# Patient Record
Sex: FEMALE | Race: WHITE | NOT HISPANIC OR LATINO | Employment: OTHER | URBAN - METROPOLITAN AREA
[De-identification: names, ages, dates, MRNs, and addresses within clinical notes are randomized per-mention and may not be internally consistent; named-entity substitution may affect disease eponyms.]

---

## 2017-03-01 ENCOUNTER — LAB (OUTPATIENT)
Dept: INTERNAL MEDICINE | Facility: CLINIC | Age: 67
End: 2017-03-01

## 2017-03-01 ENCOUNTER — TELEPHONE (OUTPATIENT)
Dept: ENDOCRINOLOGY | Facility: CLINIC | Age: 67
End: 2017-03-01

## 2017-03-01 DIAGNOSIS — R94.6 ABNORMAL THYROID FUNCTION TEST: Primary | ICD-10-CM

## 2017-03-01 DIAGNOSIS — R94.6 ABNORMAL THYROID FUNCTION TEST: ICD-10-CM

## 2017-03-01 LAB
T4 FREE SERPL-MCNC: 1.14 NG/DL (ref 0.89–1.76)
TSH SERPL DL<=0.05 MIU/L-ACNC: 3.23 MIU/ML (ref 0.35–5.35)

## 2017-03-01 PROCEDURE — 84443 ASSAY THYROID STIM HORMONE: CPT | Performed by: INTERNAL MEDICINE

## 2017-03-01 PROCEDURE — 84439 ASSAY OF FREE THYROXINE: CPT | Performed by: INTERNAL MEDICINE

## 2017-03-01 NOTE — TELEPHONE ENCOUNTER
Spoke to pt. She will stop by clinic and have TFT's completed today.   Mirtha Salinas MD      Please see PT request      ----- Message from Jeanne Springer sent at 2/28/2017  1:29 PM EST -----  Contact: PATIENT  PATIENT IS CALLING WANTING TO BE SEEN SOONER THEN HER SCHEDULED APPOINTMENT WITH DR. ROB WHICH IS 04/19/2017. DR. ROB TOOK PATIENT OF HER THYROID MEDICATION BACK IN NOVEMBER AND IS NOW HAVING ISSUES. SHE SAYS HER HAIR IS FALLING OUT, SHE IS FEELING WEAK AND HAS NO ENERGY. SHE THINKS IT MIGHT BE A THYROID ISSUE. PATIENT IS LEAVING TOWN TOMORROW FOR VACATION AND WAS HOPING TO BE SEEN WHEN SHE GETS BACK OR WHAT SUGGESTIONS YOU HAVE FOR HER AS SHE WAITS TILL HER NEXT SCHEDULED APPOINTMENT. YOU CAN REACH HER BACK [phone]

## 2017-03-10 DIAGNOSIS — E11.8 TYPE 2 DIABETES MELLITUS WITH COMPLICATION, WITHOUT LONG-TERM CURRENT USE OF INSULIN (HCC): Primary | ICD-10-CM

## 2017-03-10 RX ORDER — METFORMIN HYDROCHLORIDE 500 MG/1
1000 TABLET, EXTENDED RELEASE ORAL
Qty: 180 TABLET | Refills: 3 | Status: SHIPPED | OUTPATIENT
Start: 2017-03-10 | End: 2018-03-14 | Stop reason: SDUPTHER

## 2017-03-10 RX ORDER — BLOOD SUGAR DIAGNOSTIC
STRIP MISCELLANEOUS
Qty: 150 EACH | Refills: 3 | Status: SHIPPED | OUTPATIENT
Start: 2017-03-10 | End: 2017-03-22 | Stop reason: SDUPTHER

## 2017-03-22 ENCOUNTER — TELEPHONE (OUTPATIENT)
Dept: ENDOCRINOLOGY | Facility: CLINIC | Age: 67
End: 2017-03-22

## 2017-03-22 DIAGNOSIS — E11.8 TYPE 2 DIABETES MELLITUS WITH COMPLICATION, WITHOUT LONG-TERM CURRENT USE OF INSULIN (HCC): ICD-10-CM

## 2017-03-22 RX ORDER — BLOOD SUGAR DIAGNOSTIC
STRIP MISCELLANEOUS
Qty: 150 EACH | Refills: 3 | Status: SHIPPED | OUTPATIENT
Start: 2017-03-22 | End: 2018-07-17 | Stop reason: SDUPTHER

## 2017-03-22 NOTE — TELEPHONE ENCOUNTER
RESEND RX WITH INSTRUCTIONS TO TEST 1-2 TIMES DAILY AND NOT PRN OR AS DIRECTED.  -        Message from Jeanne Springer sent at 3/22/2017 12:19 PM EDT -----  Contact: [facility] IN [place]  THEY NEED CLARIFICATION ON THE TYPE OF TEST STRIP PATIENT NEEDS AND THE DIRECTIONS OF USAGE OF TEST STRIPS. PATIENT USED TO GET ACU CHECK PLUS TEST STRIPS WHICH THE NEW SCRIPT CALLED IN IS NOW ACU CHECK AVIA PLUS. YOU CAN REACH THEM BACK [phone]

## 2017-04-19 ENCOUNTER — OFFICE VISIT (OUTPATIENT)
Dept: ENDOCRINOLOGY | Facility: CLINIC | Age: 67
End: 2017-04-19

## 2017-04-19 VITALS
DIASTOLIC BLOOD PRESSURE: 82 MMHG | HEART RATE: 62 BPM | OXYGEN SATURATION: 98 % | BODY MASS INDEX: 33.75 KG/M2 | SYSTOLIC BLOOD PRESSURE: 122 MMHG | WEIGHT: 190.5 LBS | HEIGHT: 63 IN

## 2017-04-19 DIAGNOSIS — R94.6 ABNORMAL THYROID FUNCTION TEST: ICD-10-CM

## 2017-04-19 DIAGNOSIS — E11.8 TYPE 2 DIABETES MELLITUS WITH COMPLICATION, WITHOUT LONG-TERM CURRENT USE OF INSULIN (HCC): Primary | ICD-10-CM

## 2017-04-19 LAB
GLUCOSE BLDC GLUCOMTR-MCNC: 163 MG/DL (ref 70–130)
HBA1C MFR BLD: 7.2 %

## 2017-04-19 PROCEDURE — 82947 ASSAY GLUCOSE BLOOD QUANT: CPT | Performed by: INTERNAL MEDICINE

## 2017-04-19 PROCEDURE — 99214 OFFICE O/P EST MOD 30 MIN: CPT | Performed by: INTERNAL MEDICINE

## 2017-04-19 PROCEDURE — 83036 HEMOGLOBIN GLYCOSYLATED A1C: CPT | Performed by: INTERNAL MEDICINE

## 2017-04-19 RX ORDER — HYDROCODONE BITARTRATE AND ACETAMINOPHEN 5; 325 MG/1; MG/1
TABLET ORAL
Refills: 0 | COMMUNITY
Start: 2017-04-10 | End: 2017-10-23 | Stop reason: ALTCHOICE

## 2017-04-19 NOTE — PROGRESS NOTES
Chief Complaint   Patient presents with   • Diabetes     Follow UP      HPI:   Chayito Lua is a 67 y.o.female who returns to [facility]ocrine Clinic for f/u evaluation of of her Type 2 DM and h/o abnormal TFTs. Last clinic visit 12/05/2016. Her history is as follows:    Interim Events: has torn Right rotator cuff for which received glucocorticoid injection 2 weeks ago  - has gained 5 lbs    1) Type 2 diabetes with associated complications of CAD and mild peripheral neuropathy:  - diagnosed in approximately 2000    Current DM Medications:  - metformin ER 1000 g daily    Glucometer Review:  - pt checking once daily in AM or midday. Majority of BGs at those times are <150. Higher AM BG's are due to late night meals    DM Health Maintenance:  Ophtho: Last visit - 6/2016, neg for retinopathy per patient  Podiatry: Last visit - N/A  Monofilament: due 2017  Lipids: per pt checked at cardiologist's office. Pt is on Crestor and Zetia  Urine Microalb/Cr ratio: due 2017, on an ARB  Aspirin: 81 mg daily    2) h/o abnormal TFT's:   - in 2012 pt was having various symptoms including fatigue and joint pain. She had been prescribed plaquenil and developed anemia due to the medication. To her knowledge, her TSH was never elevated at that time. However, she was prescribed liothyronine 5 mcg BID at that time by her cardiologist. At the time, she had significant fatigue that did improve. However, she overall felt better after stopping the plaquenil.  - I had her stop the cytomel in 12/2016 and she feels much better off the medication  - Labs checked in March 2017 showed normal thyroid function:  Lab Results   Component Value Date    TSH 3.230 03/01/2017     Review of Systems   Constitutional: Negative.         5 lbs wt gain   HENT: Negative.    Eyes: Negative.    Respiratory: Negative.    Cardiovascular: Negative for chest pain and palpitations.   Gastrointestinal: Negative.  Negative for constipation, diarrhea and nausea.   Endocrine:  "Negative.    Genitourinary: Negative.    Musculoskeletal: Positive for arthralgias.        See HPI   Skin: Negative.    Neurological: Positive for numbness (bilateral great toe tingling).   Hematological: Negative.    Psychiatric/Behavioral: Negative.    All other systems reviewed and are negative.    The following portions of the patient's history were reviewed and updated as appropriate: allergies, current medications, past family history, past medical history, past social history, past surgical history and problem list.    /82  Pulse 62  Ht 63\" (160 cm)  Wt 190 lb 8 oz (86.4 kg)  SpO2 98%  BMI 33.75 kg/m2  Physical Exam   Constitutional: She is oriented to person, place, and time. She appears well-developed. No distress.   HENT:   Head: Normocephalic.   Mouth/Throat: Oropharynx is clear and moist.   Eyes: Conjunctivae and EOM are normal. Pupils are equal, round, and reactive to light.   Neck: No tracheal deviation present. No thyromegaly present.   No palpable thyroid nodules     Cardiovascular: Normal rate, regular rhythm and normal heart sounds.    No murmur heard.  Pulmonary/Chest: Effort normal and breath sounds normal. No respiratory distress.   Lymphadenopathy:     She has no cervical adenopathy.   Neurological: She is alert and oriented to person, place, and time. No cranial nerve deficit.   Skin: Skin is warm and dry. She is not diaphoretic. No erythema.   Psychiatric: She has a normal mood and affect. Her behavior is normal.   Vitals reviewed.    LABS/IMAGING:   Results for orders placed or performed in visit on 04/19/17   POC Glucose Fingerstick   Result Value Ref Range    Glucose 163 (A) 70 - 130 mg/dL   POC Glycosylated Hemoglobin (Hb A1C)   Result Value Ref Range    Hemoglobin A1C 7.2 %     Lab Results   Component Value Date    TSH 3.230 03/01/2017     ASSESSMENT/PLAN:  1) Type 2 diabetes with associated complications of CAD and mild peripheral neuropathy: controlled  - continue metformin " ER 1000 g daily  - Instructed pt to check his blood glucose pre-meal and 2-4 hour post meal three times a week, varying the meal each check. Example: Mon: pre- and post- BK, Wed: pre- and post- lunch, Fri: pre- and post- supper  - may consider adding Jardiance (empagliflozin) if needed at a later date. Discussed with patient that the FDA has approved an expanded indication for Jardiance (empagliflozin) to reduce the risk of cardiovascular death in adults with type 2 diabetes mellitus and established cardiovascular disease.   - also reviewed the potential side effects of this medication    2) h/o abnormal thyroid function test:   - pt much better off of the cytomel  - recent TFTs were normal off of medicaton  - will have patient check TSH prior to next visit    Lab slip given to patient to have routine DM health maintenance labs at her PCP office    RTC 6 months    Counseling was given to patient for the following topics:  instructions for management and risks and benefits of treatment options, see details in assessment/plan. Total face to face time of the encounter was 30 minutes and 25 minutes was spent counseling.

## 2017-04-23 RX ORDER — CETIRIZINE HYDROCHLORIDE 10 MG/1
TABLET ORAL
Refills: 0 | COMMUNITY
Start: 2017-04-21 | End: 2017-10-23 | Stop reason: ALTCHOICE

## 2017-10-23 ENCOUNTER — OFFICE VISIT (OUTPATIENT)
Dept: ENDOCRINOLOGY | Facility: CLINIC | Age: 67
End: 2017-10-23

## 2017-10-23 VITALS
BODY MASS INDEX: 34.48 KG/M2 | WEIGHT: 194.6 LBS | OXYGEN SATURATION: 98 % | HEART RATE: 78 BPM | DIASTOLIC BLOOD PRESSURE: 76 MMHG | HEIGHT: 63 IN | SYSTOLIC BLOOD PRESSURE: 132 MMHG

## 2017-10-23 DIAGNOSIS — E11.8 TYPE 2 DIABETES MELLITUS WITH COMPLICATION, WITHOUT LONG-TERM CURRENT USE OF INSULIN (HCC): Primary | ICD-10-CM

## 2017-10-23 LAB
GLUCOSE BLDC GLUCOMTR-MCNC: 160 MG/DL (ref 70–130)
HBA1C MFR BLD: 7.7 %

## 2017-10-23 PROCEDURE — G0008 ADMIN INFLUENZA VIRUS VAC: HCPCS | Performed by: INTERNAL MEDICINE

## 2017-10-23 PROCEDURE — 83036 HEMOGLOBIN GLYCOSYLATED A1C: CPT | Performed by: INTERNAL MEDICINE

## 2017-10-23 PROCEDURE — 82947 ASSAY GLUCOSE BLOOD QUANT: CPT | Performed by: INTERNAL MEDICINE

## 2017-10-23 PROCEDURE — 90662 IIV NO PRSV INCREASED AG IM: CPT | Performed by: INTERNAL MEDICINE

## 2017-10-23 PROCEDURE — 99214 OFFICE O/P EST MOD 30 MIN: CPT | Performed by: INTERNAL MEDICINE

## 2017-10-23 RX ORDER — CLINDAMYCIN HYDROCHLORIDE 150 MG/1
CAPSULE ORAL
Refills: 0 | COMMUNITY
Start: 2017-10-04 | End: 2018-07-16

## 2017-10-23 RX ORDER — NABUMETONE 500 MG/1
TABLET, FILM COATED ORAL
Refills: 0 | COMMUNITY
Start: 2017-09-05 | End: 2018-11-28

## 2017-10-23 NOTE — PROGRESS NOTES
Chief Complaint   Patient presents with   • Diabetes     Follow Up       HPI:   Chayito Lua is a 67 y.o.female who returns to [facility]ocrine Clinic for f/u evaluation of of her Type 2 DM and h/o abnormal TFTs. Last clinic visit 04/19/2017. Her history is as follows:    Interim Events:   - has torn Right rotator cuff for which received glucocorticoid injection in August  - has gained weight due to being more sedentary    1) Type 2 diabetes with associated complications of CAD and mild peripheral neuropathy:  - diagnosed in approximately 2000    Current DM Medications:  - metformin ER 1000 g daily    Glucometer Review:  - forgot meter for review today    DM Health Maintenance:  Ophtho: Last visit - 6/2016, neg for retinopathy per patient  Podiatry: Last visit - N/A  Monofilament / Foot Exam: due   Lipids: (05/2017) Tchol 157, , HDL 69, LDL 53 on Crestor and Zetia  Urine Microalb/Cr ratio: (05/2017), normal at 28.6 on an ARB  Aspirin: 81 mg daily    2) h/o abnormal TFT's:   - in 2012 pt was having various symptoms including fatigue and joint pain. She had been prescribed plaquenil and developed anemia due to the medication. To her knowledge, her TSH was never elevated at that time. However, she was prescribed liothyronine 5 mcg BID at that time by her cardiologist. At the time, she had significant fatigue that did improve. However, she overall felt better after stopping the plaquenil.  - I had her stop the cytomel in 12/2016 and she feels much better off the medication  - Labs checked in March and May 2017 showed normal thyroid function:    Review of Systems   Constitutional: Negative.         5 lbs wt gain   HENT: Negative.    Eyes: Negative.    Respiratory: Negative.    Cardiovascular: Negative for chest pain and palpitations.   Gastrointestinal: Negative.  Negative for constipation, diarrhea and nausea.   Endocrine: Negative.    Genitourinary: Negative.    Musculoskeletal: Positive for arthralgias.        See HPI  "  Skin: Negative.    Neurological: Positive for numbness (bilateral great toe tingling).   Hematological: Negative.    Psychiatric/Behavioral: Negative.    All other systems reviewed and are negative.    The following portions of the patient's history were reviewed and updated as appropriate: allergies, current medications, past family history, past medical history, past social history, past surgical history and problem list.    /76  Pulse 78  Ht 63\" (160 cm)  Wt 194 lb 9.6 oz (88.3 kg)  SpO2 98%  BMI 34.47 kg/m2  Physical Exam   Constitutional: She is oriented to person, place, and time. She appears well-developed. No distress.   HENT:   Head: Normocephalic.   Mouth/Throat: Oropharynx is clear and moist.   Eyes: Conjunctivae and EOM are normal. Pupils are equal, round, and reactive to light.   Neck: No tracheal deviation present. No thyromegaly present.   No palpable thyroid nodules     Cardiovascular: Normal rate, regular rhythm and normal heart sounds.    No murmur heard.  Pulmonary/Chest: Effort normal and breath sounds normal. No respiratory distress.   Lymphadenopathy:     She has no cervical adenopathy.   Neurological: She is alert and oriented to person, place, and time. No cranial nerve deficit.   Skin: Skin is warm and dry. She is not diaphoretic. No erythema.   Psychiatric: She has a normal mood and affect. Her behavior is normal.   Vitals reviewed.    LABS/IMAGING: outside labs reviewed and summarized in HPI  Results for orders placed or performed in visit on 10/23/17   POC Glucose Fingerstick   Result Value Ref Range    Glucose 160 (A) 70 - 130 mg/dL   POC Glycosylated Hemoglobin (Hb A1C)   Result Value Ref Range    Hemoglobin A1C 7.7 %     ASSESSMENT/PLAN:  1) Type 2 diabetes with associated complications of CAD and mild peripheral neuropathy: uncontrolled, A1C% has increased since last visit  - continue metformin ER 1000 g daily  - Instructed pt to check his blood glucose pre-meal and 2-4 " hour post meal three times a week, varying the meal each check. Example: Mon: pre- and post- BK, Wed: pre- and post- lunch, Fri: pre- and post- supper    - reviewed potential treatment options.   - reviewed carb consistent meal planning    - adding Jardiance (empagliflozin) 10 mg daily. Samples given to patient. Pt to call for Rx if tolerated   Discussed with patient that the FDA has approved an expanded indication for Jardiance (empagliflozin) to reduce the risk of cardiovascular death in adults with type 2 diabetes mellitus and established cardiovascular disease.   - also reviewed the potential side effects of this medication    2) h/o abnormal thyroid function test: resolved    - DM health maintenance labs from 05/2017 reviewed     RTC 4 months    Counseling was given to patient for the following topics:  instructions for management and risks and benefits of treatment options, see details in assessment/plan. Total face to face time of the encounter was 30 minutes and 25 minutes was spent counseling.

## 2017-10-30 ENCOUNTER — TELEPHONE (OUTPATIENT)
Dept: INTERNAL MEDICINE | Facility: CLINIC | Age: 67
End: 2017-10-30

## 2017-10-30 DIAGNOSIS — B37.31 VAGINAL CANDIDIASIS: Primary | ICD-10-CM

## 2017-10-30 RX ORDER — FLUCONAZOLE 150 MG/1
150 TABLET ORAL ONCE
Qty: 1 TABLET | Refills: 1 | Status: SHIPPED | OUTPATIENT
Start: 2017-10-30 | End: 2017-10-30

## 2017-10-30 NOTE — TELEPHONE ENCOUNTER
Spoke to patient. Sending Rx for Diflucan to he pharmacy. If candidiasis recurs, pt instructed to stop the Jardiance  Mirtha Salinas MD

## 2017-11-30 ENCOUNTER — TELEPHONE (OUTPATIENT)
Dept: INTERNAL MEDICINE | Facility: CLINIC | Age: 67
End: 2017-11-30

## 2017-12-01 DIAGNOSIS — B37.31 VAGINAL CANDIDIASIS: Primary | ICD-10-CM

## 2017-12-01 RX ORDER — FLUCONAZOLE 150 MG/1
150 TABLET ORAL ONCE
Qty: 1 TABLET | Refills: 0 | Status: SHIPPED | OUTPATIENT
Start: 2017-12-01 | End: 2017-12-01

## 2017-12-01 NOTE — TELEPHONE ENCOUNTER
Spoke to patient. Stopped Jardiance. Rx for diflucan sent. Pt to contact me later if she wants to try a GLP-1 Agonist.  Mirtha Salinas MD

## 2018-03-14 DIAGNOSIS — E11.8 TYPE 2 DIABETES MELLITUS WITH COMPLICATION, WITHOUT LONG-TERM CURRENT USE OF INSULIN (HCC): ICD-10-CM

## 2018-03-14 RX ORDER — METFORMIN HYDROCHLORIDE 500 MG/1
TABLET, EXTENDED RELEASE ORAL
Qty: 180 TABLET | Refills: 3 | Status: SHIPPED | OUTPATIENT
Start: 2018-03-14 | End: 2018-07-16 | Stop reason: SINTOL

## 2018-06-13 ENCOUNTER — TELEPHONE (OUTPATIENT)
Dept: ENDOCRINOLOGY | Facility: CLINIC | Age: 68
End: 2018-06-13

## 2018-06-13 NOTE — TELEPHONE ENCOUNTER
Received a letter from patient insurance stating there has been a recall on patient's Accu-check Elisabeth test strips . Called patient per Dr. Salinas to inform patient. Spoke with patient and she states that she checked the recall numbers and that she her numbers is not on the list and she is good to go.

## 2018-07-16 ENCOUNTER — OFFICE VISIT (OUTPATIENT)
Dept: ENDOCRINOLOGY | Facility: CLINIC | Age: 68
End: 2018-07-16

## 2018-07-16 VITALS
WEIGHT: 197 LBS | OXYGEN SATURATION: 96 % | HEART RATE: 73 BPM | BODY MASS INDEX: 34.91 KG/M2 | DIASTOLIC BLOOD PRESSURE: 70 MMHG | HEIGHT: 63 IN | SYSTOLIC BLOOD PRESSURE: 120 MMHG

## 2018-07-16 DIAGNOSIS — IMO0002 UNCONTROLLED TYPE 2 DIABETES MELLITUS WITH COMPLICATION, WITHOUT LONG-TERM CURRENT USE OF INSULIN: Primary | ICD-10-CM

## 2018-07-16 LAB
GLUCOSE BLDC GLUCOMTR-MCNC: 227 MG/DL (ref 70–130)
HBA1C MFR BLD: 8.9 %

## 2018-07-16 PROCEDURE — 82947 ASSAY GLUCOSE BLOOD QUANT: CPT | Performed by: INTERNAL MEDICINE

## 2018-07-16 PROCEDURE — 99213 OFFICE O/P EST LOW 20 MIN: CPT | Performed by: INTERNAL MEDICINE

## 2018-07-16 PROCEDURE — 83036 HEMOGLOBIN GLYCOSYLATED A1C: CPT | Performed by: INTERNAL MEDICINE

## 2018-07-16 RX ORDER — CYCLOBENZAPRINE HCL 5 MG
TABLET ORAL EVERY 8 HOURS SCHEDULED
COMMUNITY
End: 2018-11-28

## 2018-07-16 NOTE — PROGRESS NOTES
Chief Complaint   Patient presents with   • Diabetes     Patient in today for DM2      HPI:   Chayito Lua is a 68 y.o.female who returns to [facility]ocrine Clinic for f/u evaluation of of her Type 2 DM and h/o abnormal TFTs. Last clinic visit 10/23/2017. Her history is as follows:    Interim Events:   - had to cancel her 02/2018 appointment  - did not tolerate the Jardiance: caused repeat candidiasis  - metformin ER 1000 mg or 500 mg daily causing diarrhea    1) Type 2 diabetes with associated complications of CAD and mild peripheral neuropathy:  - diagnosed in approximately 2000    Current DM Medications:  - metformin ER 1000 g daily: but not tolerating, causing diarrhea    Glucometer Review:  Majority of pre-meal BGs 100's - 200's    DM Health Maintenance:  Ophtho: Last visit - 6/2016, neg for retinopathy per patient  Podiatry: Last visit - N/A  Monofilament / Foot Exam: due   Lipids: (05/2017) Tchol 157, , HDL 69, LDL 53 on Crestor and Zetia  Urine Microalb/Cr ratio: (05/2017), normal at 28.6 on an ARB  Aspirin: 81 mg daily    2) h/o abnormal TFT's:   - in 2012 pt was having various symptoms including fatigue and joint pain. She had been prescribed plaquenil and developed anemia due to the medication. To her knowledge, her TSH was never elevated at that time. However, she was prescribed liothyronine 5 mcg BID at that time by her cardiologist. At the time, she had significant fatigue that did improve. However, she overall felt better after stopping the plaquenil.  - I had her stop the cytomel in 12/2016 and she feels much better off the medication  - Labs checked in March and May 2017 showed normal thyroid function:    Review of Systems   Constitutional: Negative.         Wt gain   HENT: Negative.    Eyes: Negative.    Respiratory: Negative.    Cardiovascular: Negative for chest pain and palpitations.   Gastrointestinal: Negative.  Negative for constipation, diarrhea and nausea.   Endocrine: Negative.   "  Genitourinary: Negative.    Musculoskeletal: Positive for arthralgias.   Skin: Negative.    Neurological: Positive for numbness (bilateral great toe tingling).   Hematological: Negative.    Psychiatric/Behavioral: Negative.    All other systems reviewed and are negative.    The following portions of the patient's history were reviewed and updated as appropriate: allergies, current medications, past family history, past medical history, past social history, past surgical history and problem list.    /70   Pulse 73   Ht 160 cm (63\")   Wt 89.4 kg (197 lb)   SpO2 96%   BMI 34.90 kg/m²   Physical Exam   Constitutional: She is oriented to person, place, and time. She appears well-developed. No distress.   HENT:   Head: Normocephalic.   Mouth/Throat: Oropharynx is clear and moist.   Eyes: Conjunctivae and EOM are normal. Pupils are equal, round, and reactive to light.   Neck: No tracheal deviation present. No thyromegaly present.   No palpable thyroid nodules     Cardiovascular: Normal rate, regular rhythm and normal heart sounds.    No murmur heard.  Pulmonary/Chest: Effort normal and breath sounds normal. No respiratory distress.   Lymphadenopathy:     She has no cervical adenopathy.   Neurological: She is alert and oriented to person, place, and time. No cranial nerve deficit.   Skin: Skin is warm and dry. She is not diaphoretic. No erythema.   Psychiatric: She has a normal mood and affect. Her behavior is normal.   Vitals reviewed.    LABS/IMAGING: outside labs reviewed and summarized in HPI  Results for orders placed or performed in visit on 07/16/18   POC Glucose Fingerstick   Result Value Ref Range    Glucose 227 (A) 70 - 130 mg/dL   POC Glycosylated Hemoglobin (Hb A1C)   Result Value Ref Range    Hemoglobin A1C 8.9 %     ASSESSMENT/PLAN:  1) Type 2 diabetes with associated complications of CAD and mild peripheral neuropathy: uncontrolled, A1C% 8.9 today  - pt not tolerating Jardiance, not tolerating " metformin ER even at lower doses  - Will start with GLP-1 agonist: Samples of Bydureon 2 mg weekly given to pt. Potential side effects reviewed with patient. Pt to call for Rx if medication tolerated.  - May need to add glipizide if needed at next visit.    - Instructed pt to check his blood glucose pre-meal and 2-4 hour post meal three times a week, varying the meal each check. Example: Mon: pre- and post- BK, Wed: pre- and post- lunch, Fri: pre- and post- supper    - reviewed potential treatment options.   - reviewed carb consistent meal planning    2) h/o abnormal thyroid function test: resolved    - Will obtain copy of health maintenance labs from 05/2018 completed at Pt's PCP's office     RTC 4 months    .

## 2018-07-17 DIAGNOSIS — E11.8 TYPE 2 DIABETES MELLITUS WITH COMPLICATION, WITHOUT LONG-TERM CURRENT USE OF INSULIN (HCC): ICD-10-CM

## 2018-07-17 RX ORDER — BLOOD SUGAR DIAGNOSTIC
STRIP MISCELLANEOUS
Qty: 150 EACH | Refills: 3 | Status: SHIPPED | OUTPATIENT
Start: 2018-07-17 | End: 2020-01-20 | Stop reason: SDUPTHER

## 2018-07-20 ENCOUNTER — TELEPHONE (OUTPATIENT)
Dept: ENDOCRINOLOGY | Facility: CLINIC | Age: 68
End: 2018-07-20

## 2018-07-20 ENCOUNTER — TELEPHONE (OUTPATIENT)
Dept: INTERNAL MEDICINE | Facility: CLINIC | Age: 68
End: 2018-07-20

## 2018-07-20 NOTE — TELEPHONE ENCOUNTER
PATIENT CALLED TO FOLLOW UP ON TEST STRIP REFILL, ADVISED PATIENT THAT THIS OFFICE MAY BE WAITING ON PRIOR AUTH AND THAT IT MAY TAKE SEVERAL DAYS TO PROCESS FOR THESE TEST STRIPS. THE RX WAS SENT TO THE PHARMACY ON 7/17/2018.

## 2018-07-20 NOTE — TELEPHONE ENCOUNTER
LVM for patient to return the call concerning patients test strips. PA was done via covermymeds for accucheck neha plus test strips and denied due to insurance not covering them. Spoke with pharmacy and she is going to try to run medication under patients medicare part a and b. Pharmacy will be sending over paperwork for Dr. Salinas to sign with diagnosis.

## 2018-07-22 RX ORDER — ACETAMINOPHEN 160 MG
TABLET,DISINTEGRATING ORAL
COMMUNITY
End: 2018-11-28

## 2018-07-22 RX ORDER — LANCETS 28 GAUGE
EACH MISCELLANEOUS
COMMUNITY
Start: 2008-03-31 | End: 2018-07-22

## 2018-07-23 ENCOUNTER — TELEPHONE (OUTPATIENT)
Dept: INTERNAL MEDICINE | Facility: CLINIC | Age: 68
End: 2018-07-23

## 2018-07-23 NOTE — TELEPHONE ENCOUNTER
Received paperwork from pharmacy to fill out on patient's test strips, paperwork has been filled out by Dr. Salinas and faxed.

## 2018-07-23 NOTE — TELEPHONE ENCOUNTER
PATIENT SAID SHE SPOKE WITH SOMEONE IN THE OFFICE TODAY IN REGARDS TO HER LAB WORK THAT SHE HAD DONE AT HER LAST APPT WITH HER CARDIOLOGIST. SHE WAS CALLING TO LEAVE THE NAME AND NUMBER OF PROVIDER: DR. IDALIA DHALIWAL [phone]. THANKS.

## 2018-07-27 NOTE — TELEPHONE ENCOUNTER
LVM for patient to return the call about test strips. Informed patient that paperwork for strips have been filled out and faxed.

## 2018-08-09 DIAGNOSIS — E11.8 TYPE 2 DIABETES MELLITUS WITH COMPLICATION, WITHOUT LONG-TERM CURRENT USE OF INSULIN (HCC): Primary | ICD-10-CM

## 2018-08-09 DIAGNOSIS — IMO0002 UNCONTROLLED TYPE 2 DIABETES MELLITUS WITH COMPLICATION, WITHOUT LONG-TERM CURRENT USE OF INSULIN: ICD-10-CM

## 2018-11-28 ENCOUNTER — OFFICE VISIT (OUTPATIENT)
Dept: ENDOCRINOLOGY | Facility: CLINIC | Age: 68
End: 2018-11-28

## 2018-11-28 VITALS
BODY MASS INDEX: 34.38 KG/M2 | DIASTOLIC BLOOD PRESSURE: 68 MMHG | SYSTOLIC BLOOD PRESSURE: 134 MMHG | HEART RATE: 76 BPM | OXYGEN SATURATION: 96 % | HEIGHT: 63 IN | WEIGHT: 194 LBS

## 2018-11-28 DIAGNOSIS — E11.8 TYPE 2 DIABETES MELLITUS WITH COMPLICATION, WITHOUT LONG-TERM CURRENT USE OF INSULIN (HCC): Primary | ICD-10-CM

## 2018-11-28 LAB
GLUCOSE BLDC GLUCOMTR-MCNC: 131 MG/DL (ref 70–130)
HBA1C MFR BLD: 7.1 %

## 2018-11-28 PROCEDURE — 83036 HEMOGLOBIN GLYCOSYLATED A1C: CPT | Performed by: INTERNAL MEDICINE

## 2018-11-28 PROCEDURE — 99213 OFFICE O/P EST LOW 20 MIN: CPT | Performed by: INTERNAL MEDICINE

## 2018-11-28 PROCEDURE — 82962 GLUCOSE BLOOD TEST: CPT | Performed by: INTERNAL MEDICINE

## 2019-05-29 ENCOUNTER — OFFICE VISIT (OUTPATIENT)
Dept: ENDOCRINOLOGY | Facility: CLINIC | Age: 69
End: 2019-05-29

## 2019-05-29 VITALS
OXYGEN SATURATION: 98 % | HEIGHT: 63 IN | WEIGHT: 191 LBS | DIASTOLIC BLOOD PRESSURE: 60 MMHG | HEART RATE: 86 BPM | BODY MASS INDEX: 33.84 KG/M2 | SYSTOLIC BLOOD PRESSURE: 100 MMHG

## 2019-05-29 DIAGNOSIS — E11.8 TYPE 2 DIABETES MELLITUS WITH COMPLICATION, WITHOUT LONG-TERM CURRENT USE OF INSULIN (HCC): Primary | ICD-10-CM

## 2019-05-29 DIAGNOSIS — R94.6 ABNORMAL THYROID FUNCTION TEST: ICD-10-CM

## 2019-05-29 LAB
GLUCOSE BLDC GLUCOMTR-MCNC: 138 MG/DL (ref 70–130)
HBA1C MFR BLD: 6.8 %
T4 FREE SERPL-MCNC: 1.06 NG/DL (ref 0.93–1.7)
TSH SERPL DL<=0.05 MIU/L-ACNC: 1.78 MIU/ML (ref 0.27–4.2)

## 2019-05-29 PROCEDURE — 84443 ASSAY THYROID STIM HORMONE: CPT | Performed by: INTERNAL MEDICINE

## 2019-05-29 PROCEDURE — 83036 HEMOGLOBIN GLYCOSYLATED A1C: CPT | Performed by: INTERNAL MEDICINE

## 2019-05-29 PROCEDURE — 99213 OFFICE O/P EST LOW 20 MIN: CPT | Performed by: INTERNAL MEDICINE

## 2019-05-29 PROCEDURE — 84439 ASSAY OF FREE THYROXINE: CPT | Performed by: INTERNAL MEDICINE

## 2019-05-29 PROCEDURE — 82962 GLUCOSE BLOOD TEST: CPT | Performed by: INTERNAL MEDICINE

## 2019-07-22 DIAGNOSIS — IMO0002 UNCONTROLLED TYPE 2 DIABETES MELLITUS WITH COMPLICATION, WITHOUT LONG-TERM CURRENT USE OF INSULIN: ICD-10-CM

## 2019-07-22 RX ORDER — EXENATIDE 2 MG/.85ML
INJECTION, SUSPENSION, EXTENDED RELEASE SUBCUTANEOUS
Qty: 10.2 ML | Refills: 3 | Status: SHIPPED | OUTPATIENT
Start: 2019-07-22 | End: 2020-05-27 | Stop reason: SDUPTHER

## 2019-09-17 ENCOUNTER — APPOINTMENT (OUTPATIENT)
Dept: PREADMISSION TESTING | Facility: HOSPITAL | Age: 69
End: 2019-09-17

## 2019-09-17 ENCOUNTER — HOSPITAL ENCOUNTER (OUTPATIENT)
Dept: GENERAL RADIOLOGY | Facility: HOSPITAL | Age: 69
Discharge: HOME | End: 2019-09-17
Admitting: ORTHOPAEDIC SURGERY

## 2019-09-17 VITALS — HEIGHT: 63 IN | WEIGHT: 192.02 LBS | BODY MASS INDEX: 34.02 KG/M2

## 2019-09-17 LAB
ABO GROUP BLD: NORMAL
ALBUMIN SERPL-MCNC: 4.1 G/DL (ref 3.5–5.2)
ALBUMIN/GLOB SERPL: 1.4 G/DL
ALP SERPL-CCNC: 79 U/L (ref 39–117)
ALT SERPL W P-5'-P-CCNC: 18 U/L (ref 1–33)
ANION GAP SERPL CALCULATED.3IONS-SCNC: 14 MMOL/L (ref 5–15)
APTT PPP: 29.9 SECONDS (ref 24–37)
AST SERPL-CCNC: 21 U/L (ref 1–32)
BACTERIA UR QL AUTO: ABNORMAL /HPF
BACTERIA UR QL AUTO: NORMAL /HPF
BASOPHILS # BLD AUTO: 0.01 10*3/MM3 (ref 0–0.2)
BASOPHILS NFR BLD AUTO: 0.2 % (ref 0–1.5)
BILIRUB SERPL-MCNC: 0.6 MG/DL (ref 0.2–1.2)
BILIRUB UR QL STRIP: NEGATIVE
BILIRUB UR QL STRIP: NEGATIVE
BLD GP AB SCN SERPL QL: NEGATIVE
BUN BLD-MCNC: 16 MG/DL (ref 8–23)
BUN/CREAT SERPL: 19.5 (ref 7–25)
CALCIUM SPEC-SCNC: 9.1 MG/DL (ref 8.6–10.5)
CHLORIDE SERPL-SCNC: 113 MMOL/L (ref 98–107)
CLARITY UR: CLEAR
CLARITY UR: CLEAR
CO2 SERPL-SCNC: 27 MMOL/L (ref 22–29)
COLOR UR: YELLOW
COLOR UR: YELLOW
CREAT BLD-MCNC: 0.82 MG/DL (ref 0.57–1)
CRP SERPL-MCNC: 0.11 MG/DL (ref 0–0.5)
DEPRECATED RDW RBC AUTO: 44.7 FL (ref 37–54)
EOSINOPHIL # BLD AUTO: 0.09 10*3/MM3 (ref 0–0.4)
EOSINOPHIL NFR BLD AUTO: 1.7 % (ref 0.3–6.2)
ERYTHROCYTE [DISTWIDTH] IN BLOOD BY AUTOMATED COUNT: 13.2 % (ref 12.3–15.4)
ERYTHROCYTE [SEDIMENTATION RATE] IN BLOOD: 28 MM/HR (ref 0–30)
GFR SERPL CREATININE-BSD FRML MDRD: 69 ML/MIN/1.73
GLOBULIN UR ELPH-MCNC: 2.9 GM/DL
GLUCOSE BLD-MCNC: 154 MG/DL (ref 65–99)
GLUCOSE UR STRIP-MCNC: NEGATIVE MG/DL
GLUCOSE UR STRIP-MCNC: NEGATIVE MG/DL
HBA1C MFR BLD: 6.9 % (ref 4.8–5.6)
HCT VFR BLD AUTO: 39.9 % (ref 34–46.6)
HGB BLD-MCNC: 12.9 G/DL (ref 12–15.9)
HGB UR QL STRIP.AUTO: NEGATIVE
HGB UR QL STRIP.AUTO: NEGATIVE
HYALINE CASTS UR QL AUTO: ABNORMAL /LPF
HYALINE CASTS UR QL AUTO: NORMAL /LPF
IMM GRANULOCYTES # BLD AUTO: 0.02 10*3/MM3 (ref 0–0.05)
IMM GRANULOCYTES NFR BLD AUTO: 0.4 % (ref 0–0.5)
INR PPP: 1.03 (ref 0.85–1.16)
KETONES UR QL STRIP: NEGATIVE
KETONES UR QL STRIP: NEGATIVE
LEUKOCYTE ESTERASE UR QL STRIP.AUTO: ABNORMAL
LEUKOCYTE ESTERASE UR QL STRIP.AUTO: NEGATIVE
LYMPHOCYTES # BLD AUTO: 1.45 10*3/MM3 (ref 0.7–3.1)
LYMPHOCYTES NFR BLD AUTO: 27.3 % (ref 19.6–45.3)
MCH RBC QN AUTO: 29.5 PG (ref 26.6–33)
MCHC RBC AUTO-ENTMCNC: 32.3 G/DL (ref 31.5–35.7)
MCV RBC AUTO: 91.1 FL (ref 79–97)
MONOCYTES # BLD AUTO: 0.42 10*3/MM3 (ref 0.1–0.9)
MONOCYTES NFR BLD AUTO: 7.9 % (ref 5–12)
NEUTROPHILS # BLD AUTO: 3.32 10*3/MM3 (ref 1.7–7)
NEUTROPHILS NFR BLD AUTO: 62.5 % (ref 42.7–76)
NITRITE UR QL STRIP: NEGATIVE
NITRITE UR QL STRIP: NEGATIVE
NRBC BLD AUTO-RTO: 0 /100 WBC (ref 0–0.2)
PH UR STRIP.AUTO: 5.5 [PH] (ref 5–8)
PH UR STRIP.AUTO: <=5 [PH] (ref 5–8)
PLATELET # BLD AUTO: 225 10*3/MM3 (ref 140–450)
PMV BLD AUTO: 9.9 FL (ref 6–12)
POTASSIUM BLD-SCNC: 4.8 MMOL/L (ref 3.5–5.2)
PROT SERPL-MCNC: 7 G/DL (ref 6–8.5)
PROT UR QL STRIP: NEGATIVE
PROT UR QL STRIP: NEGATIVE
PROTHROMBIN TIME: 13 SECONDS (ref 11.2–14.3)
RBC # BLD AUTO: 4.38 10*6/MM3 (ref 3.77–5.28)
RBC # UR: ABNORMAL /HPF
RBC # UR: NORMAL /HPF
REF LAB TEST METHOD: ABNORMAL
REF LAB TEST METHOD: NORMAL
RH BLD: POSITIVE
SODIUM BLD-SCNC: 154 MMOL/L (ref 136–145)
SP GR UR STRIP: 1.02 (ref 1–1.03)
SP GR UR STRIP: >=1.03 (ref 1–1.03)
SQUAMOUS #/AREA URNS HPF: ABNORMAL /HPF
SQUAMOUS #/AREA URNS HPF: NORMAL /HPF
UROBILINOGEN UR QL STRIP: ABNORMAL
UROBILINOGEN UR QL STRIP: NORMAL
WBC NRBC COR # BLD: 5.31 10*3/MM3 (ref 3.4–10.8)
WBC UR QL AUTO: ABNORMAL /HPF
WBC UR QL AUTO: NORMAL /HPF

## 2019-09-17 PROCEDURE — 85610 PROTHROMBIN TIME: CPT | Performed by: ORTHOPAEDIC SURGERY

## 2019-09-17 PROCEDURE — 81001 URINALYSIS AUTO W/SCOPE: CPT | Performed by: ORTHOPAEDIC SURGERY

## 2019-09-17 PROCEDURE — 86850 RBC ANTIBODY SCREEN: CPT | Performed by: ORTHOPAEDIC SURGERY

## 2019-09-17 PROCEDURE — 85730 THROMBOPLASTIN TIME PARTIAL: CPT | Performed by: ORTHOPAEDIC SURGERY

## 2019-09-17 PROCEDURE — 86901 BLOOD TYPING SEROLOGIC RH(D): CPT | Performed by: ORTHOPAEDIC SURGERY

## 2019-09-17 PROCEDURE — 93005 ELECTROCARDIOGRAM TRACING: CPT

## 2019-09-17 PROCEDURE — 93010 ELECTROCARDIOGRAM REPORT: CPT | Performed by: INTERNAL MEDICINE

## 2019-09-17 PROCEDURE — 85025 COMPLETE CBC W/AUTO DIFF WBC: CPT | Performed by: ORTHOPAEDIC SURGERY

## 2019-09-17 PROCEDURE — 86900 BLOOD TYPING SEROLOGIC ABO: CPT | Performed by: ORTHOPAEDIC SURGERY

## 2019-09-17 PROCEDURE — 36415 COLL VENOUS BLD VENIPUNCTURE: CPT

## 2019-09-17 PROCEDURE — 83036 HEMOGLOBIN GLYCOSYLATED A1C: CPT | Performed by: ORTHOPAEDIC SURGERY

## 2019-09-17 PROCEDURE — 80053 COMPREHEN METABOLIC PANEL: CPT | Performed by: ORTHOPAEDIC SURGERY

## 2019-09-17 PROCEDURE — 85652 RBC SED RATE AUTOMATED: CPT | Performed by: ORTHOPAEDIC SURGERY

## 2019-09-17 PROCEDURE — 86140 C-REACTIVE PROTEIN: CPT | Performed by: ORTHOPAEDIC SURGERY

## 2019-09-17 PROCEDURE — 71046 X-RAY EXAM CHEST 2 VIEWS: CPT

## 2019-09-17 PROCEDURE — 87086 URINE CULTURE/COLONY COUNT: CPT | Performed by: ORTHOPAEDIC SURGERY

## 2019-09-17 NOTE — DISCHARGE INSTRUCTIONS
The following information and instructions were given:    Do not eat, drink, smoke or chew gum after midnight the night before surgery. This also includes no mints.  Take all routine, prescribed medications including heart and blood pressure medicines with a sip of water unless otherwise instructed by your physician.   Do NOT take diabetic medication unless instructed by your physician.    If you were instructed to drink 20 ounces (or until full) of Gatorade the morning of surgery, the Gatorade must be completed 1 hour before arrival time on the day of surgery .  No RED Gatorade.      DO NOT shave for two days before your procedure.  Do not wear makeup.      DO NOT wear fingernail polish (gel/regular) and/or acrylic/artificial nails on the day of surgery.   If you have had a recent manicure and would rather not remove polish or artificial nails, then the minimum requirement is that the polish/artificial nails must be removed from the middle finger on each hand.      If you are having surgery/procedure on an upper extremity, then fingernail polish/artificial fingernails must be removed for surgery.  NO EXCEPTIONS.      If you are having surgery/procedure on a lower extremity, then toenail polish on both extremities must be removed for surgery.  NO EXCEPTIONS.    Remove all jewelry (advise to go to jeweler if unable to remove).  Jewelry, especially rings, can no longer be taped for surgery.    Leave anything you consider valuable at home.    Leave your suitcase in the car until after your surgery.    Bring the following with you the day of your procedure (when applicable)       -picture ID and insurance cards       -Co-pay/deductible required by insurance       -Medications in the original bottles (not a list) including all over-the-counter medications if not brought to PAT       -Copy of advance directive, living will or power of  documents if not brought to PAT       -CPAP or BIPAP mask and tubing (do not  bring machine)       -Skin prep instruction(s) sheet       -PAT Pass    Education booklet, brochure, handout or binder related to procedure given to patient.    When applicable, an ERAS booklet was given to patient.    Pain Control After Surgery handout given to patient.    Respirex use (handout given to patient) and pneumonia prevention education provided.    Signs and Symptoms of infection discussed.    DVT Prevention education given.  Stressing the importance of ambulation.    Please apply Chlorhexadine wipes to surgical area (if instructed) the night before procedure and the AM of procedure and document date/time of applications on skin prep instruction sheet.

## 2019-09-17 NOTE — PAT
Per Anesthesia Request, patient instructed not to take their ACE/ARB medications on the AM of surgery.    Patient to apply Chlorhexadine wipes  to surgical area (as instructed) the night before procedure and the AM of procedure. Wipes provided.    Patient instructed to drink 20 ounces (or until full) of Gatorade and it needs to be completed 1 hour before given arrival time for procedure (NO RED Gatorade)    Patient verbalized understanding.    MEMORY SCREEN PASSED.    CATH UA PERFORMED.     PT SENT TO JOINT CLASS AFTER CATH UA.    PT SENT TO RADIOLOGY BEFORE PAT APPT.    CARDIAC CLEARANCE IN CHART FROM 8/19/2019.    Too early to draw type and screen in PAT.  Please obtain specimen in pre-op on the day of surgery.

## 2019-09-18 LAB — BACTERIA SPEC AEROBE CULT: NO GROWTH

## 2019-09-29 ENCOUNTER — ANESTHESIA EVENT (OUTPATIENT)
Dept: PERIOP | Facility: HOSPITAL | Age: 69
End: 2019-09-29

## 2019-09-29 RX ORDER — FAMOTIDINE 10 MG/ML
20 INJECTION, SOLUTION INTRAVENOUS ONCE
Status: CANCELLED | OUTPATIENT
Start: 2019-09-29 | End: 2019-09-29

## 2019-09-30 ENCOUNTER — HOSPITAL ENCOUNTER (OUTPATIENT)
Facility: HOSPITAL | Age: 69
LOS: 1 days | Discharge: HOME | End: 2019-10-01
Attending: ORTHOPAEDIC SURGERY | Admitting: ORTHOPAEDIC SURGERY

## 2019-09-30 ENCOUNTER — ANESTHESIA (OUTPATIENT)
Dept: PERIOP | Facility: HOSPITAL | Age: 69
End: 2019-09-30

## 2019-09-30 ENCOUNTER — APPOINTMENT (OUTPATIENT)
Dept: GENERAL RADIOLOGY | Facility: HOSPITAL | Age: 69
End: 2019-09-30

## 2019-09-30 DIAGNOSIS — Z78.9 IMPAIRED MOBILITY AND ADLS: ICD-10-CM

## 2019-09-30 DIAGNOSIS — Z74.09 IMPAIRED MOBILITY AND ADLS: ICD-10-CM

## 2019-09-30 DIAGNOSIS — Z96.652 STATUS POST TOTAL LEFT KNEE REPLACEMENT: Primary | ICD-10-CM

## 2019-09-30 PROBLEM — M25.569 KNEE PAIN: Status: ACTIVE | Noted: 2019-09-30

## 2019-09-30 PROBLEM — M17.12 ARTHRITIS OF LEFT KNEE: Status: ACTIVE | Noted: 2019-09-30

## 2019-09-30 LAB
ABO GROUP BLD: NORMAL
BLD GP AB SCN SERPL QL: NEGATIVE
GLUCOSE BLDC GLUCOMTR-MCNC: 145 MG/DL (ref 70–130)
GLUCOSE BLDC GLUCOMTR-MCNC: 148 MG/DL (ref 70–130)
GLUCOSE BLDC GLUCOMTR-MCNC: 202 MG/DL (ref 70–130)
GLUCOSE BLDC GLUCOMTR-MCNC: 256 MG/DL (ref 70–130)
RH BLD: POSITIVE
T&S EXPIRATION DATE: NORMAL

## 2019-09-30 PROCEDURE — 63710000001 RANOLAZINE 500 MG TABLET SUSTAINED-RELEASE 12 HOUR: Performed by: NURSE PRACTITIONER

## 2019-09-30 PROCEDURE — A9270 NON-COVERED ITEM OR SERVICE: HCPCS | Performed by: NURSE PRACTITIONER

## 2019-09-30 PROCEDURE — C1776 JOINT DEVICE (IMPLANTABLE): HCPCS | Performed by: ORTHOPAEDIC SURGERY

## 2019-09-30 PROCEDURE — 86900 BLOOD TYPING SEROLOGIC ABO: CPT | Performed by: ORTHOPAEDIC SURGERY

## 2019-09-30 PROCEDURE — 63710000001 PANTOPRAZOLE 40 MG TABLET DELAYED-RELEASE: Performed by: NURSE PRACTITIONER

## 2019-09-30 PROCEDURE — 63710000001 CARVEDILOL 6.25 MG TABLET: Performed by: NURSE PRACTITIONER

## 2019-09-30 PROCEDURE — 63710000001 ROSUVASTATIN 20 MG TABLET: Performed by: NURSE PRACTITIONER

## 2019-09-30 PROCEDURE — 25010000002 ROPIVACAINE PER 1 MG: Performed by: ORTHOPAEDIC SURGERY

## 2019-09-30 PROCEDURE — 63710000001 HYDROCODONE-ACETAMINOPHEN 5-325 MG TABLET: Performed by: ORTHOPAEDIC SURGERY

## 2019-09-30 PROCEDURE — 25010000002 ONDANSETRON PER 1 MG: Performed by: NURSE ANESTHETIST, CERTIFIED REGISTERED

## 2019-09-30 PROCEDURE — 86850 RBC ANTIBODY SCREEN: CPT | Performed by: ORTHOPAEDIC SURGERY

## 2019-09-30 PROCEDURE — A9270 NON-COVERED ITEM OR SERVICE: HCPCS | Performed by: ORTHOPAEDIC SURGERY

## 2019-09-30 PROCEDURE — 73560 X-RAY EXAM OF KNEE 1 OR 2: CPT

## 2019-09-30 PROCEDURE — 25010000002 ROPIVACAINE PER 1 MG: Performed by: NURSE ANESTHETIST, CERTIFIED REGISTERED

## 2019-09-30 PROCEDURE — 25010000002 HYDROMORPHONE 1 MG/ML SOLUTION: Performed by: ORTHOPAEDIC SURGERY

## 2019-09-30 PROCEDURE — 86923 COMPATIBILITY TEST ELECTRIC: CPT

## 2019-09-30 PROCEDURE — 97116 GAIT TRAINING THERAPY: CPT

## 2019-09-30 PROCEDURE — C1713 ANCHOR/SCREW BN/BN,TIS/BN: HCPCS | Performed by: ORTHOPAEDIC SURGERY

## 2019-09-30 PROCEDURE — 63710000001 DOCUSATE SODIUM 100 MG CAPSULE: Performed by: ORTHOPAEDIC SURGERY

## 2019-09-30 PROCEDURE — 63710000001 INSULIN LISPRO (HUMAN) PER 5 UNITS: Performed by: NURSE PRACTITIONER

## 2019-09-30 PROCEDURE — 63710000001 LOSARTAN 25 MG TABLET: Performed by: NURSE PRACTITIONER

## 2019-09-30 PROCEDURE — 97161 PT EVAL LOW COMPLEX 20 MIN: CPT

## 2019-09-30 PROCEDURE — 25010000002 DEXAMETHASONE PER 1 MG: Performed by: NURSE ANESTHETIST, CERTIFIED REGISTERED

## 2019-09-30 PROCEDURE — 25010000002 PROPOFOL 10 MG/ML EMULSION: Performed by: NURSE ANESTHETIST, CERTIFIED REGISTERED

## 2019-09-30 PROCEDURE — 25010000003 CEFAZOLIN IN DEXTROSE 2-4 GM/100ML-% SOLUTION: Performed by: ORTHOPAEDIC SURGERY

## 2019-09-30 PROCEDURE — 86901 BLOOD TYPING SEROLOGIC RH(D): CPT | Performed by: ORTHOPAEDIC SURGERY

## 2019-09-30 PROCEDURE — 25010000002 PHENYLEPHRINE PER 1 ML: Performed by: NURSE ANESTHETIST, CERTIFIED REGISTERED

## 2019-09-30 PROCEDURE — 82962 GLUCOSE BLOOD TEST: CPT

## 2019-09-30 DEVICE — CMT BONE PALACOS R HI/VISC 1X40: Type: IMPLANTABLE DEVICE | Site: KNEE | Status: FUNCTIONAL

## 2019-09-30 DEVICE — LEGION CRUCIATE RETAINING HIGH                                    FLEX HIGHLY CROSS LINKED                                    POLYETHYLENE SIZE 3-4 9MM
Type: IMPLANTABLE DEVICE | Site: KNEE | Status: FUNCTIONAL
Brand: LEGION

## 2019-09-30 DEVICE — IMPLANTABLE DEVICE: Type: IMPLANTABLE DEVICE | Site: KNEE | Status: FUNCTIONAL

## 2019-09-30 DEVICE — GENESIS II NON-POROUS TIBIAL                                    BASEPLATE SIZE 3 LEFT
Type: IMPLANTABLE DEVICE | Site: KNEE | Status: FUNCTIONAL
Brand: GENESIS II

## 2019-09-30 DEVICE — LEGION NARROW CRUCIATE RETAINING                                    OXINIUM SIZE 5N LEFT
Type: IMPLANTABLE DEVICE | Site: KNEE | Status: FUNCTIONAL
Brand: LEGION

## 2019-09-30 DEVICE — GENESIS II RESURFACING PATELLAR                                    PROSTHESIS  32MM
Type: IMPLANTABLE DEVICE | Site: KNEE | Status: FUNCTIONAL
Brand: GENESIS II

## 2019-09-30 RX ORDER — CARVEDILOL 6.25 MG/1
6.25 TABLET ORAL EVERY 12 HOURS SCHEDULED
Status: DISCONTINUED | OUTPATIENT
Start: 2019-09-30 | End: 2019-10-01 | Stop reason: HOSPADM

## 2019-09-30 RX ORDER — FAMOTIDINE 20 MG/1
20 TABLET, FILM COATED ORAL ONCE
Status: COMPLETED | OUTPATIENT
Start: 2019-09-30 | End: 2019-09-30

## 2019-09-30 RX ORDER — ROPIVACAINE HYDROCHLORIDE 5 MG/ML
INJECTION, SOLUTION EPIDURAL; INFILTRATION; PERINEURAL AS NEEDED
Status: DISCONTINUED | OUTPATIENT
Start: 2019-09-30 | End: 2019-09-30 | Stop reason: HOSPADM

## 2019-09-30 RX ORDER — CEFAZOLIN SODIUM 2 G/100ML
2 INJECTION, SOLUTION INTRAVENOUS EVERY 8 HOURS
Status: COMPLETED | OUTPATIENT
Start: 2019-09-30 | End: 2019-10-01

## 2019-09-30 RX ORDER — DOCUSATE SODIUM 100 MG/1
100 CAPSULE, LIQUID FILLED ORAL 2 TIMES DAILY
Status: DISCONTINUED | OUTPATIENT
Start: 2019-09-30 | End: 2019-10-01 | Stop reason: HOSPADM

## 2019-09-30 RX ORDER — LABETALOL HYDROCHLORIDE 5 MG/ML
5 INJECTION, SOLUTION INTRAVENOUS
Status: DISCONTINUED | OUTPATIENT
Start: 2019-09-30 | End: 2019-09-30 | Stop reason: HOSPADM

## 2019-09-30 RX ORDER — MAGNESIUM HYDROXIDE 1200 MG/15ML
LIQUID ORAL AS NEEDED
Status: DISCONTINUED | OUTPATIENT
Start: 2019-09-30 | End: 2019-09-30 | Stop reason: HOSPADM

## 2019-09-30 RX ORDER — NICOTINE POLACRILEX 4 MG
15 LOZENGE BUCCAL
Status: DISCONTINUED | OUTPATIENT
Start: 2019-09-30 | End: 2019-10-01 | Stop reason: HOSPADM

## 2019-09-30 RX ORDER — SODIUM CHLORIDE 0.9 % (FLUSH) 0.9 %
3-10 SYRINGE (ML) INJECTION AS NEEDED
Status: DISCONTINUED | OUTPATIENT
Start: 2019-09-30 | End: 2019-09-30 | Stop reason: HOSPADM

## 2019-09-30 RX ORDER — PROMETHAZINE HYDROCHLORIDE 12.5 MG/1
12.5 TABLET ORAL EVERY 6 HOURS PRN
Status: DISCONTINUED | OUTPATIENT
Start: 2019-09-30 | End: 2019-10-01 | Stop reason: HOSPADM

## 2019-09-30 RX ORDER — EPHEDRINE SULFATE 50 MG/ML
5 INJECTION, SOLUTION INTRAVENOUS ONCE AS NEEDED
Status: DISCONTINUED | OUTPATIENT
Start: 2019-09-30 | End: 2019-09-30 | Stop reason: HOSPADM

## 2019-09-30 RX ORDER — LOSARTAN POTASSIUM 25 MG/1
50 TABLET ORAL DAILY
Status: DISCONTINUED | OUTPATIENT
Start: 2019-09-30 | End: 2019-10-01 | Stop reason: HOSPADM

## 2019-09-30 RX ORDER — SODIUM CHLORIDE 9 MG/ML
150 INJECTION, SOLUTION INTRAVENOUS CONTINUOUS
Status: DISCONTINUED | OUTPATIENT
Start: 2019-09-30 | End: 2019-10-01 | Stop reason: HOSPADM

## 2019-09-30 RX ORDER — ACETAMINOPHEN 650 MG
TABLET, EXTENDED RELEASE ORAL AS NEEDED
Status: DISCONTINUED | OUTPATIENT
Start: 2019-09-30 | End: 2019-09-30 | Stop reason: HOSPADM

## 2019-09-30 RX ORDER — LIDOCAINE HYDROCHLORIDE 10 MG/ML
0.5 INJECTION, SOLUTION EPIDURAL; INFILTRATION; INTRACAUDAL; PERINEURAL ONCE AS NEEDED
Status: COMPLETED | OUTPATIENT
Start: 2019-09-30 | End: 2019-09-30

## 2019-09-30 RX ORDER — PREGABALIN 75 MG/1
75 CAPSULE ORAL ONCE
Status: COMPLETED | OUTPATIENT
Start: 2019-09-30 | End: 2019-09-30

## 2019-09-30 RX ORDER — ROSUVASTATIN CALCIUM 20 MG/1
20 TABLET, COATED ORAL NIGHTLY
Status: DISCONTINUED | OUTPATIENT
Start: 2019-09-30 | End: 2019-10-01 | Stop reason: HOSPADM

## 2019-09-30 RX ORDER — BISACODYL 5 MG/1
10 TABLET, DELAYED RELEASE ORAL DAILY PRN
Status: DISCONTINUED | OUTPATIENT
Start: 2019-09-30 | End: 2019-10-01 | Stop reason: HOSPADM

## 2019-09-30 RX ORDER — SODIUM CHLORIDE 0.9 % (FLUSH) 0.9 %
3-10 SYRINGE (ML) INJECTION AS NEEDED
Status: DISCONTINUED | OUTPATIENT
Start: 2019-09-30 | End: 2019-10-01 | Stop reason: HOSPADM

## 2019-09-30 RX ORDER — SODIUM CHLORIDE 0.9 % (FLUSH) 0.9 %
3 SYRINGE (ML) INJECTION EVERY 12 HOURS SCHEDULED
Status: DISCONTINUED | OUTPATIENT
Start: 2019-09-30 | End: 2019-10-01 | Stop reason: HOSPADM

## 2019-09-30 RX ORDER — RANOLAZINE 500 MG/1
500 TABLET, EXTENDED RELEASE ORAL 2 TIMES DAILY
Status: DISCONTINUED | OUTPATIENT
Start: 2019-09-30 | End: 2019-10-01 | Stop reason: HOSPADM

## 2019-09-30 RX ORDER — BUPIVACAINE HYDROCHLORIDE 5 MG/ML
INJECTION, SOLUTION PERINEURAL
Status: COMPLETED | OUTPATIENT
Start: 2019-09-30 | End: 2019-09-30

## 2019-09-30 RX ORDER — ASPIRIN 325 MG
325 TABLET ORAL DAILY
Status: DISCONTINUED | OUTPATIENT
Start: 2019-10-01 | End: 2019-10-01 | Stop reason: HOSPADM

## 2019-09-30 RX ORDER — CEFAZOLIN SODIUM 2 G/100ML
2 INJECTION, SOLUTION INTRAVENOUS ONCE
Status: COMPLETED | OUTPATIENT
Start: 2019-09-30 | End: 2019-09-30

## 2019-09-30 RX ORDER — MEPERIDINE HYDROCHLORIDE 25 MG/ML
12.5 INJECTION INTRAMUSCULAR; INTRAVENOUS; SUBCUTANEOUS
Status: DISCONTINUED | OUTPATIENT
Start: 2019-09-30 | End: 2019-09-30 | Stop reason: HOSPADM

## 2019-09-30 RX ORDER — DEXAMETHASONE SODIUM PHOSPHATE 4 MG/ML
INJECTION, SOLUTION INTRA-ARTICULAR; INTRALESIONAL; INTRAMUSCULAR; INTRAVENOUS; SOFT TISSUE AS NEEDED
Status: DISCONTINUED | OUTPATIENT
Start: 2019-09-30 | End: 2019-09-30 | Stop reason: SURG

## 2019-09-30 RX ORDER — ONDANSETRON 2 MG/ML
4 INJECTION INTRAMUSCULAR; INTRAVENOUS ONCE AS NEEDED
Status: DISCONTINUED | OUTPATIENT
Start: 2019-09-30 | End: 2019-09-30 | Stop reason: HOSPADM

## 2019-09-30 RX ORDER — SODIUM CHLORIDE, SODIUM LACTATE, POTASSIUM CHLORIDE, CALCIUM CHLORIDE 600; 310; 30; 20 MG/100ML; MG/100ML; MG/100ML; MG/100ML
100 INJECTION, SOLUTION INTRAVENOUS CONTINUOUS
Status: DISCONTINUED | OUTPATIENT
Start: 2019-09-30 | End: 2019-10-01 | Stop reason: HOSPADM

## 2019-09-30 RX ORDER — BISACODYL 10 MG
10 SUPPOSITORY, RECTAL RECTAL DAILY PRN
Status: DISCONTINUED | OUTPATIENT
Start: 2019-09-30 | End: 2019-10-01 | Stop reason: HOSPADM

## 2019-09-30 RX ORDER — ONDANSETRON 2 MG/ML
4 INJECTION INTRAMUSCULAR; INTRAVENOUS EVERY 6 HOURS PRN
Status: DISCONTINUED | OUTPATIENT
Start: 2019-09-30 | End: 2019-10-01 | Stop reason: HOSPADM

## 2019-09-30 RX ORDER — SODIUM CHLORIDE, SODIUM LACTATE, POTASSIUM CHLORIDE, CALCIUM CHLORIDE 600; 310; 30; 20 MG/100ML; MG/100ML; MG/100ML; MG/100ML
9 INJECTION, SOLUTION INTRAVENOUS CONTINUOUS
Status: DISCONTINUED | OUTPATIENT
Start: 2019-09-30 | End: 2019-10-01 | Stop reason: HOSPADM

## 2019-09-30 RX ORDER — HYDROCODONE BITARTRATE AND ACETAMINOPHEN 5; 325 MG/1; MG/1
1 TABLET ORAL EVERY 4 HOURS PRN
Status: DISCONTINUED | OUTPATIENT
Start: 2019-09-30 | End: 2019-10-01 | Stop reason: HOSPADM

## 2019-09-30 RX ORDER — SODIUM CHLORIDE 0.9 % (FLUSH) 0.9 %
3 SYRINGE (ML) INJECTION EVERY 12 HOURS SCHEDULED
Status: DISCONTINUED | OUTPATIENT
Start: 2019-09-30 | End: 2019-09-30 | Stop reason: HOSPADM

## 2019-09-30 RX ORDER — LABETALOL HYDROCHLORIDE 5 MG/ML
10 INJECTION, SOLUTION INTRAVENOUS EVERY 4 HOURS PRN
Status: DISCONTINUED | OUTPATIENT
Start: 2019-09-30 | End: 2019-10-01 | Stop reason: HOSPADM

## 2019-09-30 RX ORDER — NALOXONE HCL 0.4 MG/ML
0.4 VIAL (ML) INJECTION AS NEEDED
Status: DISCONTINUED | OUTPATIENT
Start: 2019-09-30 | End: 2019-09-30 | Stop reason: HOSPADM

## 2019-09-30 RX ORDER — DEXTROSE MONOHYDRATE 25 G/50ML
25 INJECTION, SOLUTION INTRAVENOUS
Status: DISCONTINUED | OUTPATIENT
Start: 2019-09-30 | End: 2019-10-01 | Stop reason: HOSPADM

## 2019-09-30 RX ORDER — PANTOPRAZOLE SODIUM 40 MG/1
40 TABLET, DELAYED RELEASE ORAL DAILY
Status: DISCONTINUED | OUTPATIENT
Start: 2019-09-30 | End: 2019-10-01 | Stop reason: HOSPADM

## 2019-09-30 RX ORDER — PROPOFOL 10 MG/ML
VIAL (ML) INTRAVENOUS AS NEEDED
Status: DISCONTINUED | OUTPATIENT
Start: 2019-09-30 | End: 2019-09-30 | Stop reason: SURG

## 2019-09-30 RX ORDER — BUPIVACAINE HYDROCHLORIDE 2.5 MG/ML
INJECTION, SOLUTION EPIDURAL; INFILTRATION; INTRACAUDAL
Status: COMPLETED | OUTPATIENT
Start: 2019-09-30 | End: 2019-09-30

## 2019-09-30 RX ORDER — ONDANSETRON 2 MG/ML
INJECTION INTRAMUSCULAR; INTRAVENOUS AS NEEDED
Status: DISCONTINUED | OUTPATIENT
Start: 2019-09-30 | End: 2019-09-30 | Stop reason: SURG

## 2019-09-30 RX ORDER — NALOXONE HCL 0.4 MG/ML
0.1 VIAL (ML) INJECTION
Status: DISCONTINUED | OUTPATIENT
Start: 2019-09-30 | End: 2019-10-01 | Stop reason: HOSPADM

## 2019-09-30 RX ORDER — FENTANYL CITRATE 50 UG/ML
50 INJECTION, SOLUTION INTRAMUSCULAR; INTRAVENOUS
Status: DISCONTINUED | OUTPATIENT
Start: 2019-09-30 | End: 2019-09-30 | Stop reason: HOSPADM

## 2019-09-30 RX ADMIN — PROPOFOL 30 MG: 10 INJECTION, EMULSION INTRAVENOUS at 10:00

## 2019-09-30 RX ADMIN — PREGABALIN 75 MG: 75 CAPSULE ORAL at 08:35

## 2019-09-30 RX ADMIN — HYDROCODONE BITARTRATE AND ACETAMINOPHEN 1 TABLET: 5; 325 TABLET ORAL at 20:09

## 2019-09-30 RX ADMIN — LOSARTAN POTASSIUM 50 MG: 25 TABLET, FILM COATED ORAL at 15:59

## 2019-09-30 RX ADMIN — RANOLAZINE 500 MG: 500 TABLET, FILM COATED, EXTENDED RELEASE ORAL at 20:10

## 2019-09-30 RX ADMIN — ROPIVACAINE HYDROCHLORIDE 10 ML/HR: 5 INJECTION, SOLUTION EPIDURAL; INFILTRATION; PERINEURAL at 11:55

## 2019-09-30 RX ADMIN — PHENYLEPHRINE HYDROCHLORIDE 100 MCG: 10 INJECTION INTRAVENOUS at 10:38

## 2019-09-30 RX ADMIN — SODIUM CHLORIDE 150 ML/HR: 9 INJECTION, SOLUTION INTRAVENOUS at 19:50

## 2019-09-30 RX ADMIN — BUPIVACAINE HYDROCHLORIDE 20 ML: 2.5 INJECTION, SOLUTION EPIDURAL; INFILTRATION; INTRACAUDAL; PERINEURAL at 11:40

## 2019-09-30 RX ADMIN — DEXAMETHASONE SODIUM PHOSPHATE 4 MG: 4 INJECTION, SOLUTION INTRAMUSCULAR; INTRAVENOUS at 10:08

## 2019-09-30 RX ADMIN — ONDANSETRON 4 MG: 2 INJECTION INTRAMUSCULAR; INTRAVENOUS at 11:06

## 2019-09-30 RX ADMIN — HYDROMORPHONE HYDROCHLORIDE 0.5 MG: 1 INJECTION, SOLUTION INTRAMUSCULAR; INTRAVENOUS; SUBCUTANEOUS at 18:27

## 2019-09-30 RX ADMIN — INSULIN LISPRO 4 UNITS: 100 INJECTION, SOLUTION INTRAVENOUS; SUBCUTANEOUS at 21:54

## 2019-09-30 RX ADMIN — SODIUM CHLORIDE, POTASSIUM CHLORIDE, SODIUM LACTATE AND CALCIUM CHLORIDE 9 ML/HR: 600; 310; 30; 20 INJECTION, SOLUTION INTRAVENOUS at 08:29

## 2019-09-30 RX ADMIN — TRANEXAMIC ACID 1000 MG: 100 INJECTION, SOLUTION INTRAVENOUS at 10:07

## 2019-09-30 RX ADMIN — PANTOPRAZOLE SODIUM 40 MG: 40 TABLET, DELAYED RELEASE ORAL at 15:59

## 2019-09-30 RX ADMIN — CARVEDILOL 6.25 MG: 6.25 TABLET, FILM COATED ORAL at 20:10

## 2019-09-30 RX ADMIN — TRANEXAMIC ACID 1000 MG: 100 INJECTION, SOLUTION INTRAVENOUS at 11:06

## 2019-09-30 RX ADMIN — FAMOTIDINE 20 MG: 20 TABLET ORAL at 08:35

## 2019-09-30 RX ADMIN — MUPIROCIN 1 APPLICATION: 20 OINTMENT TOPICAL at 08:36

## 2019-09-30 RX ADMIN — PHENYLEPHRINE HYDROCHLORIDE 100 MCG: 10 INJECTION INTRAVENOUS at 10:42

## 2019-09-30 RX ADMIN — PHENYLEPHRINE HYDROCHLORIDE 100 MCG: 10 INJECTION INTRAVENOUS at 10:47

## 2019-09-30 RX ADMIN — PHENYLEPHRINE HYDROCHLORIDE 0.3 MCG/KG/MIN: 10 INJECTION INTRAVENOUS at 10:54

## 2019-09-30 RX ADMIN — ROSUVASTATIN CALCIUM 20 MG: 20 TABLET, FILM COATED ORAL at 20:10

## 2019-09-30 RX ADMIN — INSULIN LISPRO 3 UNITS: 100 INJECTION, SOLUTION INTRAVENOUS; SUBCUTANEOUS at 17:30

## 2019-09-30 RX ADMIN — CEFAZOLIN SODIUM 2 G: 2 INJECTION, SOLUTION INTRAVENOUS at 17:31

## 2019-09-30 RX ADMIN — PROPOFOL 100 MCG/KG/MIN: 10 INJECTION, EMULSION INTRAVENOUS at 10:07

## 2019-09-30 RX ADMIN — PHENYLEPHRINE HYDROCHLORIDE 100 MCG: 10 INJECTION INTRAVENOUS at 10:27

## 2019-09-30 RX ADMIN — HYDROCODONE BITARTRATE AND ACETAMINOPHEN 1 TABLET: 5; 325 TABLET ORAL at 16:02

## 2019-09-30 RX ADMIN — CEFAZOLIN SODIUM 2 G: 2 INJECTION, SOLUTION INTRAVENOUS at 09:56

## 2019-09-30 RX ADMIN — BUPIVACAINE HYDROCHLORIDE 2 ML: 5 INJECTION, SOLUTION PERINEURAL at 10:04

## 2019-09-30 RX ADMIN — DOCUSATE SODIUM 100 MG: 100 CAPSULE, LIQUID FILLED ORAL at 20:10

## 2019-09-30 RX ADMIN — LIDOCAINE HYDROCHLORIDE 0.5 ML: 10 INJECTION, SOLUTION EPIDURAL; INFILTRATION; INTRACAUDAL; PERINEURAL at 08:29

## 2019-09-30 RX ADMIN — SODIUM CHLORIDE 150 ML/HR: 9 INJECTION, SOLUTION INTRAVENOUS at 12:51

## 2019-10-01 VITALS
DIASTOLIC BLOOD PRESSURE: 45 MMHG | HEART RATE: 72 BPM | RESPIRATION RATE: 16 BRPM | OXYGEN SATURATION: 93 % | SYSTOLIC BLOOD PRESSURE: 119 MMHG | HEIGHT: 63 IN | BODY MASS INDEX: 34.02 KG/M2 | TEMPERATURE: 98.5 F | WEIGHT: 192 LBS

## 2019-10-01 LAB
ANION GAP SERPL CALCULATED.3IONS-SCNC: 12 MMOL/L (ref 5–15)
BASOPHILS # BLD AUTO: 0.01 10*3/MM3 (ref 0–0.2)
BASOPHILS NFR BLD AUTO: 0.1 % (ref 0–1.5)
BUN BLD-MCNC: 13 MG/DL (ref 8–23)
BUN/CREAT SERPL: 15.5 (ref 7–25)
CALCIUM SPEC-SCNC: 8.4 MG/DL (ref 8.6–10.5)
CHLORIDE SERPL-SCNC: 101 MMOL/L (ref 98–107)
CO2 SERPL-SCNC: 24 MMOL/L (ref 22–29)
CREAT BLD-MCNC: 0.84 MG/DL (ref 0.57–1)
DEPRECATED RDW RBC AUTO: 43.7 FL (ref 37–54)
EOSINOPHIL # BLD AUTO: 0 10*3/MM3 (ref 0–0.4)
EOSINOPHIL NFR BLD AUTO: 0 % (ref 0.3–6.2)
ERYTHROCYTE [DISTWIDTH] IN BLOOD BY AUTOMATED COUNT: 12.9 % (ref 12.3–15.4)
GFR SERPL CREATININE-BSD FRML MDRD: 67 ML/MIN/1.73
GLUCOSE BLD-MCNC: 182 MG/DL (ref 65–99)
GLUCOSE BLDC GLUCOMTR-MCNC: 164 MG/DL (ref 70–130)
GLUCOSE BLDC GLUCOMTR-MCNC: 185 MG/DL (ref 70–130)
HCT VFR BLD AUTO: 37.3 % (ref 34–46.6)
HGB BLD-MCNC: 12 G/DL (ref 12–15.9)
IMM GRANULOCYTES # BLD AUTO: 0.03 10*3/MM3 (ref 0–0.05)
IMM GRANULOCYTES NFR BLD AUTO: 0.3 % (ref 0–0.5)
LYMPHOCYTES # BLD AUTO: 0.87 10*3/MM3 (ref 0.7–3.1)
LYMPHOCYTES NFR BLD AUTO: 9.8 % (ref 19.6–45.3)
MCH RBC QN AUTO: 29.8 PG (ref 26.6–33)
MCHC RBC AUTO-ENTMCNC: 32.2 G/DL (ref 31.5–35.7)
MCV RBC AUTO: 92.6 FL (ref 79–97)
MONOCYTES # BLD AUTO: 0.75 10*3/MM3 (ref 0.1–0.9)
MONOCYTES NFR BLD AUTO: 8.5 % (ref 5–12)
NEUTROPHILS # BLD AUTO: 7.21 10*3/MM3 (ref 1.7–7)
NEUTROPHILS NFR BLD AUTO: 81.3 % (ref 42.7–76)
NRBC BLD AUTO-RTO: 0 /100 WBC (ref 0–0.2)
PLAT MORPH BLD: NORMAL
PLATELET # BLD AUTO: 181 10*3/MM3 (ref 140–450)
PMV BLD AUTO: 10.1 FL (ref 6–12)
POTASSIUM BLD-SCNC: 3.9 MMOL/L (ref 3.5–5.2)
RBC # BLD AUTO: 4.03 10*6/MM3 (ref 3.77–5.28)
RBC MORPH BLD: NORMAL
SODIUM BLD-SCNC: 137 MMOL/L (ref 136–145)
WBC MORPH BLD: NORMAL
WBC NRBC COR # BLD: 8.87 10*3/MM3 (ref 3.4–10.8)

## 2019-10-01 PROCEDURE — 63710000001 LOSARTAN 25 MG TABLET: Performed by: NURSE PRACTITIONER

## 2019-10-01 PROCEDURE — 63710000001 DOCUSATE SODIUM 100 MG CAPSULE: Performed by: ORTHOPAEDIC SURGERY

## 2019-10-01 PROCEDURE — A9270 NON-COVERED ITEM OR SERVICE: HCPCS | Performed by: ORTHOPAEDIC SURGERY

## 2019-10-01 PROCEDURE — A9270 NON-COVERED ITEM OR SERVICE: HCPCS | Performed by: NURSE PRACTITIONER

## 2019-10-01 PROCEDURE — 80048 BASIC METABOLIC PNL TOTAL CA: CPT | Performed by: NURSE PRACTITIONER

## 2019-10-01 PROCEDURE — 97166 OT EVAL MOD COMPLEX 45 MIN: CPT | Performed by: OCCUPATIONAL THERAPIST

## 2019-10-01 PROCEDURE — 97535 SELF CARE MNGMENT TRAINING: CPT | Performed by: OCCUPATIONAL THERAPIST

## 2019-10-01 PROCEDURE — 63710000001 POLYETHYLENE GLYCOL PACK: Performed by: ORTHOPAEDIC SURGERY

## 2019-10-01 PROCEDURE — 63710000001 PANTOPRAZOLE 40 MG TABLET DELAYED-RELEASE: Performed by: NURSE PRACTITIONER

## 2019-10-01 PROCEDURE — 85007 BL SMEAR W/DIFF WBC COUNT: CPT | Performed by: ORTHOPAEDIC SURGERY

## 2019-10-01 PROCEDURE — 25010000002 HYDROMORPHONE 1 MG/ML SOLUTION: Performed by: ORTHOPAEDIC SURGERY

## 2019-10-01 PROCEDURE — 97110 THERAPEUTIC EXERCISES: CPT

## 2019-10-01 PROCEDURE — 63710000001 CARVEDILOL 6.25 MG TABLET: Performed by: NURSE PRACTITIONER

## 2019-10-01 PROCEDURE — 63710000001 RANOLAZINE 500 MG TABLET SUSTAINED-RELEASE 12 HOUR: Performed by: NURSE PRACTITIONER

## 2019-10-01 PROCEDURE — 63710000001 ASPIRIN 325 MG TABLET: Performed by: ORTHOPAEDIC SURGERY

## 2019-10-01 PROCEDURE — 97116 GAIT TRAINING THERAPY: CPT

## 2019-10-01 PROCEDURE — 63710000001 HYDROCODONE-ACETAMINOPHEN 5-325 MG TABLET: Performed by: ORTHOPAEDIC SURGERY

## 2019-10-01 PROCEDURE — 25010000003 CEFAZOLIN IN DEXTROSE 2-4 GM/100ML-% SOLUTION: Performed by: ORTHOPAEDIC SURGERY

## 2019-10-01 PROCEDURE — 82962 GLUCOSE BLOOD TEST: CPT

## 2019-10-01 PROCEDURE — 85025 COMPLETE CBC W/AUTO DIFF WBC: CPT | Performed by: ORTHOPAEDIC SURGERY

## 2019-10-01 RX ORDER — ASPIRIN 325 MG
325 TABLET ORAL DAILY
Qty: 30 TABLET | Refills: 0 | Status: SHIPPED | OUTPATIENT
Start: 2019-10-02 | End: 2019-11-20

## 2019-10-01 RX ORDER — DOCUSATE SODIUM 100 MG/1
100 CAPSULE, LIQUID FILLED ORAL 2 TIMES DAILY
Qty: 60 CAPSULE | Refills: 0 | Status: SHIPPED | OUTPATIENT
Start: 2019-10-01 | End: 2020-11-18

## 2019-10-01 RX ORDER — HYDROCODONE BITARTRATE AND ACETAMINOPHEN 5; 325 MG/1; MG/1
1 TABLET ORAL EVERY 4 HOURS PRN
Qty: 40 TABLET | Refills: 0 | Status: SHIPPED | OUTPATIENT
Start: 2019-10-01 | End: 2019-10-10

## 2019-10-01 RX ADMIN — HYDROCODONE BITARTRATE AND ACETAMINOPHEN 1 TABLET: 5; 325 TABLET ORAL at 05:07

## 2019-10-01 RX ADMIN — PANTOPRAZOLE SODIUM 40 MG: 40 TABLET, DELAYED RELEASE ORAL at 08:31

## 2019-10-01 RX ADMIN — POLYETHYLENE GLYCOL 3350 17 G: 17 POWDER, FOR SOLUTION ORAL at 08:31

## 2019-10-01 RX ADMIN — CARVEDILOL 6.25 MG: 6.25 TABLET, FILM COATED ORAL at 08:31

## 2019-10-01 RX ADMIN — HYDROMORPHONE HYDROCHLORIDE 0.5 MG: 1 INJECTION, SOLUTION INTRAMUSCULAR; INTRAVENOUS; SUBCUTANEOUS at 00:14

## 2019-10-01 RX ADMIN — INSULIN LISPRO 2 UNITS: 100 INJECTION, SOLUTION INTRAVENOUS; SUBCUTANEOUS at 08:31

## 2019-10-01 RX ADMIN — RANOLAZINE 500 MG: 500 TABLET, FILM COATED, EXTENDED RELEASE ORAL at 08:31

## 2019-10-01 RX ADMIN — DOCUSATE SODIUM 100 MG: 100 CAPSULE, LIQUID FILLED ORAL at 08:31

## 2019-10-01 RX ADMIN — LOSARTAN POTASSIUM 50 MG: 25 TABLET, FILM COATED ORAL at 08:31

## 2019-10-01 RX ADMIN — HYDROCODONE BITARTRATE AND ACETAMINOPHEN 1 TABLET: 5; 325 TABLET ORAL at 08:30

## 2019-10-01 RX ADMIN — ASPIRIN 325 MG ORAL TABLET 325 MG: 325 PILL ORAL at 08:31

## 2019-10-01 RX ADMIN — CEFAZOLIN SODIUM 2 G: 2 INJECTION, SOLUTION INTRAVENOUS at 02:01

## 2019-10-01 RX ADMIN — HYDROCODONE BITARTRATE AND ACETAMINOPHEN 1 TABLET: 5; 325 TABLET ORAL at 12:36

## 2019-10-01 NOTE — PLAN OF CARE
Problem: Patient Care Overview  Goal: Plan of Care Review  Outcome: Outcome(s) achieved Date Met: 10/01/19   10/01/19 1058   OTHER   Outcome Summary Pt s/p L TKA and now w/moderate pain. Pt however very motivated and demonstrated excellent teach-back and understanding of safety w/transfers, ADLs and AE for improving LBD/LBB. Plan for d/c home with family; has all needed DME and AE.    Coping/Psychosocial   Plan of Care Reviewed With patient       Problem: Knee Arthroplasty (Total, Partial) (Adult)  Goal: Signs and Symptoms of Listed Potential Problems Will be Absent, Minimized or Managed (Knee Arthroplasty)  Outcome: Outcome(s) achieved Date Met: 10/01/19   10/01/19 1058   Goal/Outcome Evaluation   Problems Assessed (Knee Arthroplasty) functional deficit   Problems Present (Knee Arthroplasty) functional deficit

## 2019-10-01 NOTE — PROGRESS NOTES
Case Management Discharge Note    Final Note: Plan is home with [facility].  will transport. Denies and discharge needs.     Destination      No service has been selected for the patient.      Durable Medical Equipment - Selection Complete      Service Provider Request Status Selected Services Address Phone Number Fax Number    [facility] [place] Selected Durable Medical Equipment [address] [address] [phone]      Dialysis/Infusion      No service has been selected for the patient.      Home Medical Care      No service has been selected for the patient.      Therapy - Selection Complete      Service Provider Request Status Selected Services Address Phone Number Fax Number    [facility] Selected Outpatient [facility] Therapy [address] [facility] [address] [phone]      Community Resources      No service has been selected for the patient.             Final Discharge Disposition Code: 06 - home with home health care

## 2019-10-01 NOTE — THERAPY DISCHARGE NOTE
Patient Name: Chayito Lua  : 1950    MRN: [id number]                              Today's Date: 10/1/2019       Admit Date: 2019    Visit Dx:     ICD-10-CM ICD-9-CM   1. Status post total left knee replacement Z96.652 V43.65   2. Impaired mobility and ADLs Z74.09 799.89     Patient Active Problem List   Diagnosis   • Type 2 diabetes mellitus with complication (CMS/HCC)   • Chronic back pain   • CAD (coronary artery disease)   • Essential hypertension   • Hyperlipidemia   • Overweight   • Anemia   • Arthritis   • Esophageal reflux   • Osteoporosis   • Arthritis of left knee   • Knee pain   • Status post total left knee replacement     Past Medical History:   Diagnosis Date   • Anemia    • Arthritis    • Diabetes mellitus (CMS/HCC)     DX , CHECK BS ONCE DAILY, LAST A1C 6.8%   • Esophageal reflux    • Hearing loss     NO AIDS   • Heart disease    • High cholesterol    • Hypertension    • Osteoporosis    • Ovarian cyst    • Wears glasses      Past Surgical History:   Procedure Laterality Date   • CHOLECYSTECTOMY     • COLONOSCOPY         • CORONARY ARTERY BYPASS GRAFT     • HX OVARIAN CYSTECTOMY     • TONSILLECTOMY     • TOTAL KNEE ARTHROPLASTY Left 2019    Procedure: TOTAL KNEE ARTHROPLASTY LEFT;  Surgeon: Landon Peck MD;  Location: [facility];  Service: Orthopedics     General Information     Row Name 10/01/19 1312          PT Evaluation Time/Intention    Document Type  therapy note (daily note);discharge treatment  -LR     Mode of Treatment  physical therapy;individual therapy  -LR     Row Name 10/01/19 1312          General Information    Patient Profile Reviewed?  yes  -LR     Existing Precautions/Restrictions  fall;other (see comments) L adductor canal nerve catheter  -LR     Row Name 10/01/19 1312          Cognitive Assessment/Intervention- PT/OT    Orientation Status (Cognition)  oriented x 4  -LR     Row Name 10/01/19 1312          Safety Issues, Functional Mobility    Safety  Issues Affecting Function (Mobility)  positioning of assistive device;safety precaution awareness;safety precautions follow-through/compliance;sequencing abilities  -LR       User Key  (r) = Recorded By, (t) = Taken By, (c) = Cosigned By    Initials Name Provider Type    LR Shazia Danielle, PT Physical Therapist        Mobility     Row Name 10/01/19 1112          Bed Mobility Assessment/Treatment    Supine-Sit [place] (Bed Mobility)  not tested  -LR     Comment (Bed Mobility)  Up in bathroom on arrival and [facility] at end of treatment.   -LR     Row Name 10/01/19 1112          Transfer Assessment/Treatment    Comment (Transfers)  Verbal cues to push up from chair to stand and to reach back for chair to lower into sitting. Verbal cues to step L LE out before t/f for comfort. Had difficulty with this d/t shoes.   -     Row Name 10/01/19 1112          Sit-Stand Transfer    Sit-Stand [place] (Transfers)  verbal cues;contact guard  -LR     Assistive Device (Sit-Stand Transfers)  walker, front-wheeled  -LR     Row Name 10/01/19 1112          Gait/Stairs Assessment/Training    64724 - Gait Training Minutes   13  -LR     [place] Level (Gait)  verbal cues;contact guard  -LR     Assistive Device (Gait)  walker, front-wheeled  -LR     Distance in Feet (Gait)  150  -LR     Pattern (Gait)  step-through  -LR     Deviations/Abnormal Patterns (Gait)  left sided deviations;antalgic;bilateral deviations;jessika decreased;gait speed decreased;stride length decreased  -LR     Bilateral Gait Deviations  forward flexed posture;heel strike decreased  -LR     Left Sided Gait Deviations  weight shift ability decreased  -LR     [place] Level (Stairs)  verbal cues;contact guard  -LR     Assistive Device (Stairs)  walker, front-wheeled  -LR     Handrail Location (Stairs)  none  -LR     Number of Steps (Stairs)  1  -LR     Ascending Technique (Stairs)  step-to-step  -LR     Descending Technique (Stairs)  step-to-step   -LR     Comment (Gait/Stairs)  Patient ambulated with step through gait pattern at slow pace. Verbal cues for correct sequencing of steps, increased L LE weight bearing/stance phase and decreased UE weight bearing. Improved with cues for correction. Patient having difficulty clearing L foot, at times catching toes during swing phase. Verbal cues for increased L knee flexion and L ankle DF during swing phase. Improved with cues for correction. Gait limited by pain. Verbal cues to back up to step with RW and to step up backwards with strong LE first and down with weak LE first. No LOB or unsteadiness.   -LR     Row Name 10/01/19 1112          Mobility Assessment/Intervention    Extremity Weight-bearing Status  left lower extremity  -LR     Left Lower Extremity (Weight-bearing Status)  weight-bearing as tolerated (WBAT)  -LR       User Key  (r) = Recorded By, (t) = Taken By, (c) = Cosigned By    Initials Name Provider Type    Shazia Romero, PT Physical Therapist        Obj/Interventions     Row Name 10/01/19 1112          General ROM    GENERAL ROM COMMENTS  18-80 degrees; lacking 18 degrees extension AROM, 80 degrees flexion AAROM in sitting  -LR     Row Name 10/01/19 1112          Therapeutic Exercise    Lower Extremity (Therapeutic Exercise)  heel slides, left;LAQ (long arc quad), left;quad sets, left;SAQ (short arc quad), left;SLR (straight leg raise), left heel slides in sitting and reclined  -LR     Lower Extremity Range of Motion (Therapeutic Exercise)  ankle dorsiflexion/plantar flexion, left  -LR     Exercise Type (Therapeutic Exercise)  AROM (active range of motion);isotonic contraction, concentric;isometric contraction, static;AAROM (active assistive range of motion)  -LR     Position (Therapeutic Exercise)  seated  -LR     Sets/Reps (Therapeutic Exercise)  x15 reps each  -LR     Comment (Therapeutic Exercise)  cues for technique; min assist heel slides, SAQ, LAQ, SLR  -LR       User Key  (r) =  Recorded By, (t) = Taken By, (c) = Cosigned By    Initials Name Provider Type    LR Shazia Danielle, PT Physical Therapist        Goals/Plan     Row Name 10/01/19 1112          Bed Mobility Goal 1 (PT)    Activity/Assistive Device (Bed Mobility Goal 1, PT)  sit to supine/supine to sit  -LR     [place] Level/Cues Needed (Bed Mobility Goal 1, PT)  conditional independence  -LR     Time Frame (Bed Mobility Goal 1, PT)  long term goal (LTG);3 days  -LR     Progress/Outcomes (Bed Mobility Goal 1, PT)  goal not met;discharged from [facility]  -     Row Name 10/01/19 1112          Transfer Goal 1 (PT)    Activity/Assistive Device (Transfer Goal 1, PT)  sit-to-stand/stand-to-sit;walker, rolling  -LR     [place] Level/Cues Needed (Transfer Goal 1, PT)  conditional independence  -LR     Time Frame (Transfer Goal 1, PT)  long term goal (LTG);3 days  -LR     Progress/Outcome (Transfer Goal 1, PT)  goal not met;discharged from [facility]  -     Row Name 10/01/19 1112          Gait Training Goal 1 (PT)    Activity/Assistive Device (Gait Training Goal 1, PT)  gait (walking locomotion);walker, rolling  -LR     [place] Level (Gait Training Goal 1, PT)  conditional independence  -LR     Distance (Gait Goal 1, PT)  500 feet  -LR     Time Frame (Gait Training Goal 1, PT)  long term goal (LTG);3 days  -LR     Progress/Outcome (Gait Training Goal 1, PT)  goal not met;discharged from [facility]  -     Row Name 10/01/19 1112          ROM Goal 1 (PT)    ROM Goal 1 (PT)  0-90 degrees AAROM L knee  -LR     Time Frame (ROM Goal 1, PT)  long term goal (LTG);3 days  -LR     Progress/Outcome (ROM Goal 1, PT)  goal not met;discharged from [facility]     Row Name 10/01/19 1112          Stairs Goal 1 (PT)    Activity/Assistive Device (Stairs Goal 1, PT)  ascending stairs;descending stairs;step-to-step;walker, rolling backwards  -LR     [place] Level/Cues Needed (Stairs Goal 1, PT)  contact guard assist  -LR     Number  of Stairs (Stairs Goal 1, PT)  1  -LR     Time Frame (Stairs Goal 1, PT)  long term goal (LTG);3 days  -LR     Progress/Outcome (Stairs Goal 1, PT)  goal met  -LR       User Key  (r) = Recorded By, (t) = Taken By, (c) = Cosigned By    Initials Name Provider Type    Shazia Romero, PT Physical Therapist        Clinical Impression     Row Name 10/01/19 1112          Pain Assessment    Additional Documentation  Pain Scale: Numbers Pre/Post-Treatment (Group)  -LR     Row Name 10/01/19 1112          Pain Scale: Numbers Pre/Post-Treatment    Pain Scale: Numbers, Pretreatment  4/10  -LR     Pain Scale: Numbers, Post-Treatment  5/10  -LR     Pain Location - Side  Left  -LR     Pain Location - Orientation  generalized  -LR     Pain Location  knee  -LR     Pain Intervention(s)  Ambulation/increased activity;Repositioned;Cold applied  -LR     Row Name 10/01/19 1112          Plan of Care Review    Plan of Care Reviewed With  patient;spouse  -LR     Row Name 10/01/19 1112          Positioning and Restraints    Pre-Treatment Position  bathroom  -LR     Post Treatment Position  chair  -LR     In Chair  notified nsg;reclined;sitting;call light within reach;encouraged to call for assist;exit alarm on;with family/caregiver;legs elevated  -LR       User Key  (r) = Recorded By, (t) = Taken By, (c) = Cosigned By    Initials Name Provider Type    Shazia Romero, PT Physical Therapist        Outcome Measures     Row Name 10/01/19 1112          How much help from another person do you currently need...    Turning from your back to your side while in flat bed without using bedrails?  3  -LR     Moving from lying on back to sitting on the side of a flat bed without bedrails?  3  -LR     Moving to and from a bed to a chair (including a wheelchair)?  3  -LR     Standing up from a chair using your arms (e.g., wheelchair, bedside chair)?  3  -LR     Climbing 3-5 steps with a railing?  3  -LR     To walk in hospital room?  3   -LR     AM-PAC 6 Clicks Score (PT)  18  -LR     Row Name 10/01/19 1112          Functional Assessment    Outcome Measure Options  AM-PAC 6 Clicks Basic Mobility (PT)  -LR       User Key  (r) = Recorded By, (t) = Taken By, (c) = Cosigned By    Initials Name Provider Type    Shazia Romero, PT Physical Therapist        Physical Therapy Education     Title: PT OT SLP Therapies (Done)     Topic: Physical Therapy (Done)     Point: Mobility training (Done)     Learning Progress Summary           Patient Acceptance, E,D,H, VU,DU by LR at 10/1/2019 11:12 AM    Comment:  Issued and reviewed written/illustrated HEP. Educated on precautions, weight bearing status, correct sit<->stand t/f technique, correct gait mechanics, correct stair training technique, and correct car t/f technique.    Acceptance, E,D, VU,NR by LR at 9/30/2019  3:17 PM    Comment:  Educated on precautions, weight bearing status, correct supine to sit t/f technique, correct sit<->stand t/f technique, correct gait mechanics, and progression of POC.   Significant Other Acceptance, E,D,H, VU,DU by LR at 10/1/2019 11:12 AM    Comment:  Issued and reviewed written/illustrated HEP. Educated on precautions, weight bearing status, correct sit<->stand t/f technique, correct gait mechanics, correct stair training technique, and correct car t/f technique.    Acceptance, E,D, VU,NR by LR at 9/30/2019  3:17 PM    Comment:  Educated on precautions, weight bearing status, correct supine to sit t/f technique, correct sit<->stand t/f technique, correct gait mechanics, and progression of POC.                   Point: Home exercise program (Done)     Learning Progress Summary           Patient Acceptance, E,D,H, VU,DU by LR at 10/1/2019 11:12 AM    Comment:  Issued and reviewed written/illustrated HEP. Educated on precautions, weight bearing status, correct sit<->stand t/f technique, correct gait mechanics, correct stair training technique, and correct car t/f  technique.    Acceptance, E,D, VU,NR by LR at 9/30/2019  3:17 PM    Comment:  Educated on precautions, weight bearing status, correct supine to sit t/f technique, correct sit<->stand t/f technique, correct gait mechanics, and progression of POC.   Significant Other Acceptance, E,D,H, VU,DU by LR at 10/1/2019 11:12 AM    Comment:  Issued and reviewed written/illustrated HEP. Educated on precautions, weight bearing status, correct sit<->stand t/f technique, correct gait mechanics, correct stair training technique, and correct car t/f technique.    Acceptance, E,D, VU,NR by LR at 9/30/2019  3:17 PM    Comment:  Educated on precautions, weight bearing status, correct supine to sit t/f technique, correct sit<->stand t/f technique, correct gait mechanics, and progression of POC.                   Point: Body mechanics (Done)     Learning Progress Summary           Patient Acceptance, E,D,H, VU,DU by LR at 10/1/2019 11:12 AM    Comment:  Issued and reviewed written/illustrated HEP. Educated on precautions, weight bearing status, correct sit<->stand t/f technique, correct gait mechanics, correct stair training technique, and correct car t/f technique.    Acceptance, E,D, VU,NR by LR at 9/30/2019  3:17 PM    Comment:  Educated on precautions, weight bearing status, correct supine to sit t/f technique, correct sit<->stand t/f technique, correct gait mechanics, and progression of POC.   Significant Other Acceptance, E,D,H, VU,DU by LR at 10/1/2019 11:12 AM    Comment:  Issued and reviewed written/illustrated HEP. Educated on precautions, weight bearing status, correct sit<->stand t/f technique, correct gait mechanics, correct stair training technique, and correct car t/f technique.    Acceptance, E,D, VU,NR by LR at 9/30/2019  3:17 PM    Comment:  Educated on precautions, weight bearing status, correct supine to sit t/f technique, correct sit<->stand t/f technique, correct gait mechanics, and progression of POC.                    Point: Precautions (Done)     Learning Progress Summary           Patient Acceptance, E,D,H, VU,DU by LR at 10/1/2019 11:12 AM    Comment:  Issued and reviewed written/illustrated HEP. Educated on precautions, weight bearing status, correct sit<->stand t/f technique, correct gait mechanics, correct stair training technique, and correct car t/f technique.    Acceptance, E,D, VU,NR by LR at 9/30/2019  3:17 PM    Comment:  Educated on precautions, weight bearing status, correct supine to sit t/f technique, correct sit<->stand t/f technique, correct gait mechanics, and progression of POC.   Significant Other Acceptance, E,D,H, VU,DU by LR at 10/1/2019 11:12 AM    Comment:  Issued and reviewed written/illustrated HEP. Educated on precautions, weight bearing status, correct sit<->stand t/f technique, correct gait mechanics, correct stair training technique, and correct car t/f technique.    Acceptance, E,D, VU,NR by LR at 9/30/2019  3:17 PM    Comment:  Educated on precautions, weight bearing status, correct supine to sit t/f technique, correct sit<->stand t/f technique, correct gait mechanics, and progression of POC.                               User Key     Initials Effective Dates Name Provider Type Discipline    LR 06/19/15 -  Shazia Danielle, PT Physical Therapist PT              PT Recommendation and Plan  Planned Therapy Interventions (PT Eval): balance training, bed mobility training, home exercise program, gait training, patient/family education, ROM (range of motion), stair training, strengthening, transfer training  Outcome Summary/Treatment Plan (PT)  Anticipated Equipment Needs at Discharge (PT): front wheeled walker  Anticipated Discharge Disposition (PT): home with assist, home with home health  Plan of Care Reviewed With: patient, spouse  Progress: improving  Outcome Summary: Patient ambulated 150 feet with RW and step through gait pattern, limited by pain. Extension AROM quite limited, lacking  18 degrees. Flexion AAROM 80 degrees in sitting. Patient climbed 1 step backwards with RW with no difficulty. Patient has been d/c home with HHPT.      Time Calculation:   PT Charges     Row Name 10/01/19 1112             Time Calculation    Start Time  1112  -LR      PT Received On  10/01/19  -LR      PT Goal Re-Cert Due Date  10/10/19  -LR         Time Calculation- PT    Total Timed Code Minutes- PT  23 minute(s)  -LR         Timed Charges    74429 - PT Therapeutic Exercise Minutes  10  -LR      44017 - Gait Training Minutes   13  -LR        User Key  (r) = Recorded By, (t) = Taken By, (c) = Cosigned By    Initials Name Provider Type    LR Shazia Danielle, PT Physical Therapist        Therapy Charges for Today     Code Description Service Date Service Provider Modifiers Qty    42740284666 HC GAIT TRAINING EA 15 MIN 9/30/2019 Shazia Danielle, PT GP 1    02401204882 HC PT THER SUPP EA 15 MIN 9/30/2019 Shazia Danielle, PT GP 2    13686382359 HC PT EVAL LOW COMPLEXITY 3 9/30/2019 Shazia Danielle, PT GP 1    99053186212 HC PT THER PROC EA 15 MIN 10/1/2019 Shazia Danielle, PT GP 1    00017132884 HC GAIT TRAINING EA 15 MIN 10/1/2019 Shazia Danielle, PT GP 1          PT G-Codes  Outcome Measure Options: AM-PAC 6 Clicks Basic Mobility (PT)  AM-PAC 6 Clicks Score (PT): 18  AM-PAC 6 Clicks Score (OT): 21    PT Discharge Summary  Anticipated Discharge Disposition (PT): home with assist, home with home health  Reason for Discharge: Discharge from facility  Outcomes Achieved: Patient able to partially acheive established goals  Discharge Destination: Home with assist, Home with home health    Shazia Danielle, PT  10/1/2019

## 2019-10-01 NOTE — PROGRESS NOTES
"Chayito Lua  [id number]  1950    /52 (BP Location: Right arm, Patient Position: Lying)   Pulse 80   Temp 98.3 °F (36.8 °C) (Oral)   Resp 16   Ht 160 cm (63\")   Wt 87.1 kg (192 lb)   SpO2 93%   BMI 34.01 kg/m²     Lab Results (last 24 hours)     Procedure Component Value Units Date/Time    POC Glucose Once [[id number]]  (Abnormal) Collected:  10/01/19 0726    Specimen:  Blood Updated:  10/01/19 0736     Glucose 185 mg/dL     CBC & Differential [[id number]] Collected:  10/01/19 0542    Specimen:  Blood Updated:  10/01/19 0726    Narrative:       The following orders were created for panel order CBC & Differential.  Procedure                               Abnormality         Status                     ---------                               -----------         ------                     CBC Auto Differential[[id number]]        Abnormal            Final result                 Please view results for these tests on the individual orders.    CBC Auto Differential [[id number]]  (Abnormal) Collected:  10/01/19 0542    Specimen:  Blood Updated:  10/01/19 0726     WBC 8.87 10*3/mm3      RBC 4.03 10*6/mm3      Hemoglobin 12.0 g/dL      Hematocrit 37.3 %      MCV 92.6 fL      MCH 29.8 pg      MCHC 32.2 g/dL      RDW 12.9 %      RDW-SD 43.7 fl      MPV 10.1 fL      Platelets 181 10*3/mm3      Neutrophil % 81.3 %      Lymphocyte % 9.8 %      Monocyte % 8.5 %      Eosinophil % 0.0 %      Basophil % 0.1 %      Immature Grans % 0.3 %      Neutrophils, Absolute 7.21 10*3/mm3      Lymphocytes, Absolute 0.87 10*3/mm3      Monocytes, Absolute 0.75 10*3/mm3      Eosinophils, Absolute 0.00 10*3/mm3      Basophils, Absolute 0.01 10*3/mm3      Immature Grans, Absolute 0.03 10*3/mm3      nRBC 0.0 /100 WBC     Narrative:       Appended report. These results have been appended to a previously verified report.    Scan Slide [[id number]]  (Normal) Collected:  10/01/19 0542    Specimen:  Blood Updated:  10/01/19 0726     RBC " Morphology Normal     WBC Morphology Normal     Platelet Morphology Normal    Basic Metabolic Panel [[id number]]  (Abnormal) Collected:  10/01/19 0542    Specimen:  Blood Updated:  10/01/19 0659     Glucose 182 mg/dL      BUN 13 mg/dL      Creatinine 0.84 mg/dL      Sodium 137 mmol/L      Potassium 3.9 mmol/L      Chloride 101 mmol/L      CO2 24.0 mmol/L      Calcium 8.4 mg/dL      eGFR Non African Amer 67 mL/min/1.73      BUN/Creatinine Ratio 15.5     Anion Gap 12.0 mmol/L     Narrative:       GFR Normal >60  Chronic Kidney Disease <60  Kidney Failure <15    POC Glucose Once [[id number]]  (Abnormal) Collected:  09/30/19 2038    Specimen:  Blood Updated:  09/30/19 2040     Glucose 256 mg/dL     POC Glucose Once [[id number]]  (Abnormal) Collected:  09/30/19 1724    Specimen:  Blood Updated:  09/30/19 1726     Glucose 202 mg/dL     POC Glucose Once [[id number]]  (Abnormal) Collected:  09/30/19 1216    Specimen:  Blood Updated:  09/30/19 1223     Glucose 148 mg/dL           Patient Care Team:  Brenda Charles APRN as PCP - General (Family Medicine)  Hermilo Hou MD as Consulting Physician (Cardiology)  Sudheer Gallo MD as Consulting Physician (Rheumatology)  Makenzie Hill MD as Obstetrician (Obstetrics and Gynecology)  Mirtha Salinas MD as Consulting Physician (Endocrinology)    SUBJECTIVE  Pain well controlled.  Good start in physical therapy.    PHYSICAL EXAM  Incision clean and dry  Minimal thigh and leg swelling  Neurovascularly intact to knee and toes       Status post total left knee replacement    Type 2 diabetes mellitus with complication (CMS/[facility])    CAD (coronary artery disease)    Essential hypertension    Hyperlipidemia    Arthritis of left knee    Knee pain      PLAN / DISPOSITION:  Hemoglobin stable no transfusion anticipated  Continue regional block  Discharge home today/tomorrow when needed by physical therapy    Landon Peck MD  10/01/19  8:48 AM

## 2019-10-01 NOTE — PROGRESS NOTES
[facility]    Acute pain service Inpatient Progress Note    Patient Name: Chayito Lua  :  1950  MRN:  [id number]        Acute Pain  Service Inpatient Progress Note:    Analgesia:Good  Pain Score:0/10  LOC: alert and awake  Resp Status: room air  Cardiac: VS stable  Side Effects:None  Catheter Site:clean, dressing intact and dry  Cath type: peripheral nerve cath with ON Q  Infusion rate: 10ml/hr  Catheter Plan:Catheter to remain Insitu and Continue catheter infusion rate unchanged  Comments: Anterior = 0  Posterior = 3

## 2019-10-01 NOTE — THERAPY DISCHARGE NOTE
Acute Care - Occupational Therapy Initial Eval/Discharge   [place]     Patient Name: Chayito Lua  : 1950  MRN: [id number]  Today's Date: 10/1/2019     Date of Referral to OT: 10/01/19  Referring Physician: MD Cisco      Admit Date: 2019       ICD-10-CM ICD-9-CM   1. Status post total left knee replacement Z96.652 V43.65   2. Impaired mobility and ADLs Z74.09 799.89     Patient Active Problem List   Diagnosis   • Type 2 diabetes mellitus with complication (CMS/HCC)   • Chronic back pain   • CAD (coronary artery disease)   • Essential hypertension   • Hyperlipidemia   • Overweight   • Anemia   • Arthritis   • Esophageal reflux   • Osteoporosis   • Arthritis of left knee   • Knee pain   • Status post total left knee replacement     Past Medical History:   Diagnosis Date   • Anemia    • Arthritis    • Diabetes mellitus (CMS/HCC)     DX , CHECK BS ONCE DAILY, LAST A1C 6.8%   • Esophageal reflux    • Hearing loss     NO AIDS   • Heart disease    • High cholesterol    • Hypertension    • Osteoporosis    • Ovarian cyst    • Wears glasses      Past Surgical History:   Procedure Laterality Date   • CHOLECYSTECTOMY     • COLONOSCOPY         • CORONARY ARTERY BYPASS GRAFT     • HX OVARIAN CYSTECTOMY     • TONSILLECTOMY     • TOTAL KNEE ARTHROPLASTY Left 2019    Procedure: TOTAL KNEE ARTHROPLASTY LEFT;  Surgeon: Landon Peck MD;  Location: [facility];  Service: Orthopedics          OT ASSESSMENT FLOWSHEET (last 12 hours)      Occupational Therapy Evaluation     Row Name 10/01/19 1058                   OT Evaluation Time/Intention    Subjective Information  no complaints  -ST        Document Type  evaluation;therapy note (daily note);discharge evaluation/summary  -ST        Mode of Treatment  individual therapy;occupational therapy  -ST        Patient Effort  good  -ST        Symptoms Noted During/After Treatment  increased pain  -ST        Comment  pre-medicated   -ST           General  Information    Patient Profile Reviewed?  yes  -ST        Referring Physician  MD Cisco  -ST        Patient Observations  alert;cooperative;agree to therapy  -ST        Patient/Family Observations  family present  -ST        General Observations of Patient  supine; nerve cath intact  -ST        Prior Level of Function  independent:;all household mobility;transfer;bed mobility;feeding;grooming;min assist:;community mobility;dressing;bathing;home management;cooking;cleaning inc. pain and dysfunction w/ADLs and mobility  -ST        Pertinent History of Current Functional Problem  Pt presents for sx management of long-standing L knee pain and dysfunction that impacted pt's ability to perform ADLs and mobility. Pt now POD 1 and s/p L TKA   -ST        Existing Precautions/Restrictions  fall;other (see comments) AC cath  -ST        Equipment Issued to Patient  leg  has reacher at home  -ST        Risks Reviewed  LOB;nausea/vomiting;dizziness;increased discomfort;change in vital signs;increased drainage;patient and family:  -ST        Benefits Reviewed  patient and family:;improve function;increase independence;increase strength;increase balance;decrease pain;increase knowledge  -ST        Barriers to Rehab  previous functional deficit  -ST           Relationship/Environment    Primary Source of Support/Comfort  spouse  -ST        Lives With  spouse  -ST        Concerns About Impact on Relationships   to assist   -ST           Resource/Environmental Concerns    Current Living Arrangements  home/apartment/condo  -ST           Cognitive Assessment/Interventions    Additional Documentation  Cognitive Assessment/Intervention (Group)  -ST           Cognitive Assessment/Intervention- PT/OT    Affect/Mental Status (Cognitive)  WNL  -ST        Orientation Status (Cognition)  oriented x 4  -ST        Follows Commands (Cognition)  WNL  -ST        Cognitive Function (Cognitive)  WNL  -ST           Safety Issues,  Functional Mobility    Impairments Affecting Function (Mobility)  pain;range of motion (ROM)  -ST           Mobility Assessment/Treatment    Extremity Weight-bearing Status  left lower extremity  -        Left Lower Extremity (Weight-bearing Status)  weight-bearing as tolerated (WBAT)  -           Bed Mobility Assessment/Treatment    Supine-Sit [place] (Bed Mobility)  conditional independence  -        Bed Mobility, Safety Issues  decreased use of legs for bridging/pushing  -        Assistive Device (Bed Mobility)  leg ;head of bed elevated  -ST        Comment (Bed Mobility)  issued leg    -           Functional Mobility    Functional Mobility- Ind. Level  supervision required  -        Functional Mobility- Device  rolling walker  -        Functional Mobility-Distance (Feet)  22  -ST        Functional Mobility- Comment  several brief rest breaks d/t pain   -           Transfer Assessment/Treatment    Transfer Assessment/Treatment  sit-stand transfer;stand-sit transfer;toilet transfer  -        Comment (Transfers)  cuing for hand placement and ext. of sx LE for increased comfort   -           Sit-Stand Transfer    Sit-Stand [place] (Transfers)  supervision  -        Assistive Device (Sit-Stand Transfers)  walker, front-wheeled  -ST           Stand-Sit Transfer    Stand-Sit [place] (Transfers)  supervision  -        Assistive Device (Stand-Sit Transfers)  walker, front-wheeled  -ST           Toilet Transfer    Type (Toilet Transfer)  sit-stand;stand-sit  -ST        [place] Level (Toilet Transfer)  minimum assist (75% patient effort)  -        Assistive Device (Toilet Transfer)  walker, front-wheeled;raised toilet seat  -           ADL Assessment/Intervention    68984 - OT Self Care/Mgmt Minutes  26  -ST        BADL Assessment/Intervention  bathing;lower body dressing;grooming;toileting  -           Bathing Assessment/Intervention    Bathing [place]  Level  lower body;set up  -ST        Assistive Devices (Bathing)  long-handled sponge  -ST        Bathing Position  unsupported sitting  -ST        Comment (Bathing)  simulated activity; requires use for improved comfort   -ST           Lower Body Dressing Assessment/Training    Lower Body Dressing [place] Level  doff;don;socks;shoes/slippers;set up  -ST        Assistive Devices (Lower Body Dressing)  reacher;long-handled shoe horn  -ST        Lower Body Dressing Position  unsupported sitting  -ST        Comment (Lower Body Dressing)  requires use of LH shoe horn for donning shoes d/t impaired flexibility   -ST           Grooming Assessment/Training    [place] Level (Grooming)  wash face, hands;set up  -ST        Grooming Position  sink side  -ST           Toileting Assessment/Training    [place] Level (Toileting)  adjust/manage clothing;perform perineal hygiene;set up  -ST        Assistive Devices (Toileting)  raised toilet seat  -ST           BADL Safety/Performance    Impairments, BADL Safety/Performance  pain;range of motion  -ST        Skilled BADL Treatment/Intervention  adaptive equipment training;BADL process/adaptation training  -ST        Progress in BADL Status  improvement noted;independence level  -ST           General ROM    GENERAL ROM COMMENTS  BUE's WFL for ADLs  -ST           MMT (Manual Muscle Testing)    General MMT Comments  BUE's WFL for ADLs  -ST           Motor Assessment/Interventions    Additional Documentation  Balance (Group)  -ST           Balance    Balance  dynamic sitting balance;dynamic standing balance  -ST           Dynamic Sitting Balance    Level of [place], Reaches Outside Midline (Sitting, Dynamic Balance)  independent  -ST        Sitting Position, Reaches Outside Midline (Sitting, Dynamic Balance)  sitting on edge of bed  -ST           Dynamic Standing Balance    Level of [place], Reaches Outside Midline (Standing, Dynamic Balance)  supervision  -ST         Time Able to Maintain Position, Reaches Outside Midline (Standing, Dynamic Balance)  1 to 2 minutes  -ST        Assistive Device Utilized (Supported Standing, Dynamic Balance)  walker, rolling  -ST           Sensory Assessment/Intervention    Sensory General Assessment  no sensation deficits identified  -ST           Positioning and Restraints    Pre-Treatment Position  in bed  -ST        Post Treatment Position  chair  -ST        In Chair  notified nsg;reclined;call light within reach;encouraged to call for assist;with family/caregiver;with PT  -ST           Pain Scale: Numbers Pre/Post-Treatment    Pain Scale: Numbers, Pretreatment  4/10  -ST        Pain Scale: Numbers, Post-Treatment  6/10  -ST        Pain Location - Side  Left  -ST        Pain Location - Orientation  generalized  -ST        Pain Location  knee  -ST        Pain Intervention(s)  Repositioned;Ambulation/increased activity  -ST           Wound 09/30/19 1037 Left knee Incision    Wound - Properties Group Date first assessed: 09/30/19  - Time first assessed: 1037  - Side: Left  -SS Location: knee  -SS Primary Wound Type: Incision  -SS       Clinical Impression (OT)    Date of Referral to OT  10/01/19  -ST        OT Diagnosis  impaired ADLs  -ST        Prognosis (OT Eval)  good  -ST        Patient/Family Goals Statement (OT Eval)  return home  -ST        Therapy Frequency (OT Eval)  evaluation only  -ST        Care Plan Review (OT)  evaluation/treatment results reviewed;care plan/treatment goals reviewed;risks/benefits reviewed;current/potential barriers reviewed;patient/other agree to care plan  -ST        Care Plan Review, Other Participant (OT Eval)  family  -ST        Anticipated Discharge Disposition (OT)  home with 24/7 care;home with home health  -ST           Discharge Summary (Occupational Therapy)    Additional Documentation  Discharge Summary, OT Eval (Group)  -ST           Discharge Summary, OT Eval    Reason for Discharge (OT  Discharge Summary)  patient discharged from this facility  -        Outcomes Achieved Upon Discharge (OT Discharge Summary)  discharge from facility occurred on same date as evaluation  -           Living Environment    Home Accessibility  -- walk-in shower  -          User Key  (r) = Recorded By, (t) = Taken By, (c) = Cosigned By    Initials Name Effective Dates     Crystal Maliha GRACY, OTR 06/10/18 -     SS Ingrid Blake RN 01/15/18 -           Occupational Therapy Education     Title: PT OT SLP Therapies (Done)     Topic: Occupational Therapy (Done)     Point: ADL training (Done)     Description: Instruct learner(s) on proper safety adaptation and remediation techniques during self care or transfers.   Instruct in proper use of assistive devices.    Learning Progress Summary           Patient Acceptance, E,TB,D, VU,DU by  at 10/1/2019 10:58 AM    Comment:  see flow sheet   Family Acceptance, E,TB,D, VU,DU by  at 10/1/2019 10:58 AM    Comment:  see flow sheet                   Point: Home exercise program (Done)     Description: Instruct learner(s) on appropriate technique for monitoring, assisting and/or progressing therapeutic exercises/activities.    Learning Progress Summary           Patient Acceptance, E,TB,D, VU,DU by  at 10/1/2019 10:58 AM    Comment:  see flow sheet   Family Acceptance, E,TB,D, VU,DU by  at 10/1/2019 10:58 AM    Comment:  see flow sheet                   Point: Precautions (Done)     Description: Instruct learner(s) on prescribed precautions during self-care and functional transfers.    Learning Progress Summary           Patient Acceptance, E,TB,D, VU,DU by  at 10/1/2019 10:58 AM    Comment:  see flow sheet   Family Acceptance, E,TB,D, VU,DU by  at 10/1/2019 10:58 AM    Comment:  see flow sheet                   Point: Body mechanics (Done)     Description: Instruct learner(s) on proper positioning and spine alignment during self-care, functional mobility  activities and/or exercises.    Learning Progress Summary           Patient Acceptance, E,TB,D, VU,DU by  at 10/1/2019 10:58 AM    Comment:  see flow sheet   Family Acceptance, E,TB,D, VU,DU by  at 10/1/2019 10:58 AM    Comment:  see flow sheet                               User Key     Initials Effective Dates Name Provider Type Discipline     06/10/18 -  Maliha Rojas OTR Occupational Therapist OT                OT Recommendation and Plan  Outcome Summary/Treatment Plan (OT)  Anticipated Discharge Disposition (OT): home with 24/7 care, home with home health  Reason for Discharge (OT Discharge Summary): patient discharged from this facility  Therapy Frequency (OT Eval): evaluation only  Plan of Care Review  Plan of Care Reviewed With: patient  Plan of Care Reviewed With: patient  Outcome Summary: Pt s/p L TKA and now w/moderate pain. Pt however very motivated and demonstrated excellent teach-back and understanding of safety w/transfers, ADLs and AE for improving LBD/LBB. Plan for d/c home with family; has all needed DME and AE.          Outcome Measures     Row Name 10/01/19 3842             How much help from another is currently needed...    Putting on and taking off regular lower body clothing?  3  -ST      Bathing (including washing, rinsing, and drying)  3  -ST      Toileting (which includes using toilet bed pan or urinal)  3  -ST      Putting on and taking off regular upper body clothing  4  -ST      Taking care of personal grooming (such as brushing teeth)  4  -ST      Eating meals  4  -ST      AM-PAC 6 Clicks Score (OT)  21  -ST         Functional Assessment    Outcome Measure Options  AM-PAC 6 Clicks Daily Activity (OT)  -ST        User Key  (r) = Recorded By, (t) = Taken By, (c) = Cosigned By    Initials Name Provider Type    ST Maliha Rojas OTR Occupational Therapist          Time Calculation:   Time Calculation- OT     Row Name 10/01/19 8380             Time Calculation- OT    OT Start  Time  1058  -ST      OT Received On  10/01/19  -ST         Timed Charges    33376 - OT Self Care/Mgmt Minutes  26  -ST        User Key  (r) = Recorded By, (t) = Taken By, (c) = Cosigned By    Initials Name Provider Type    Maliha Dickson OTR Occupational Therapist        Therapy Suggested Charges     Code   Minutes Charges    74820 (CPT®) Hc Ot Neuromusc Re Education Ea 15 Min      52285 (CPT®) Hc Ot Ther Proc Ea 15 Min      60704 (CPT®) Hc Ot Therapeutic Act Ea 15 Min      24136 (CPT®) Hc Ot Manual Therapy Ea 15 Min      08881 (CPT®) Hc Ot Iontophoresis Ea 15 Min      88459 (CPT®) Hc Ot Elec Stim Ea-Per 15 Min      65588 (CPT®) Hc Ot Ultrasound Ea 15 Min      66446 (CPT®) Hc Ot Self Care/Mgmt/Train Ea 15 Min 26 2    Total  26 2        Therapy Charges for Today     Code Description Service Date Service Provider Modifiers Qty    66860846060 HC OT SELF CARE/MGMT/TRAIN EA 15 MIN 10/1/2019 Maliha Rojas OTASHISH GO 2    [id number] HC OT EVAL MOD COMPLEXITY 2 10/1/2019 Maliha Rojas OTR GO 1               OT Discharge Summary  Anticipated Discharge Disposition (OT): home with 24/7 care, home with home health  Reason for Discharge: Discharge from facility  Outcomes Achieved: Discharge from facility occurred on same date as evluation  Discharge Destination: Home with assist, Home with home health    GÉNESIS Pink  10/1/2019

## 2019-10-01 NOTE — PLAN OF CARE
Problem: Patient Care Overview  Goal: Plan of Care Review  Outcome: Outcome(s) achieved Date Met: 10/01/19   10/01/19 1112   Plan of Care Review   Progress improving   OTHER   Outcome Summary Patient ambulated 150 feet with RW and step through gait pattern, limited by pain. Extension AROM quite limited, lacking 18 degrees. Flexion AAROM 80 degrees in sitting. Patient climbed 1 step backwards with RW with no difficulty. Patient has been d/c home with HHPT.    Coping/Psychosocial   Plan of Care Reviewed With patient;spouse       Problem: Knee Arthroplasty (Total, Partial) (Adult)  Goal: Signs and Symptoms of Listed Potential Problems Will be Absent, Minimized or Managed (Knee Arthroplasty)  Outcome: Outcome(s) achieved Date Met: 10/01/19   10/01/19 1112   Goal/Outcome Evaluation   Problems Assessed (Knee Arthroplasty) functional deficit;pain;range of motion decreased   Problems Present (Knee Arthroplasty) functional deficit;pain;range of motion decreased

## 2019-10-01 NOTE — PLAN OF CARE
Problem: Patient Care Overview  Goal: Plan of Care Review  Outcome: Ongoing (interventions implemented as appropriate)   10/01/19 0357   Plan of Care Review   Progress improving   OTHER   Outcome Summary Patient c/o pain during the night in her foot she states is probably due to not walking with her shoes and inserts on. PRN pain medications given and arrow pump at rate of 10 was not increased this evening. Patient was up ambulating in hallway this evening with x1 assist walker and gait belt. Ace wrap and ice pack in place no drainage note. Patient is possible dc home today.    Coping/Psychosocial   Plan of Care Reviewed With patient       Problem: Knee Arthroplasty (Total, Partial) (Adult)  Goal: Signs and Symptoms of Listed Potential Problems Will be Absent, Minimized or Managed (Knee Arthroplasty)  Outcome: Ongoing (interventions implemented as appropriate)   10/01/19 0357   Goal/Outcome Evaluation   Problems Assessed (Knee Arthroplasty) all   Problems Present (Knee Arthroplasty) functional deficit;pain

## 2019-10-01 NOTE — DISCHARGE SUMMARY
Patient Name: Chayito Lua  MRN: [id number]  : 1950  DOS: 10/1/2019    Attending: Landon Peck MD    Primary Care Provider: Brenda Charles APRN    Date of Admission:.2019  7:50 AM    Date of Discharge:  10/1/2019    Discharge Diagnosis:     Status post total left knee replacement    Arthritis of left knee    Knee pain    Type 2 diabetes mellitus with complication (CMS/HCC)    CAD (coronary artery disease)    Essential hypertension    Hyperlipidemia    Acute postop pain    Hospital Course  Patient is a 69 y.o. female presented for left total knee arthroplasty by Dr. Peck.     She underwent surgery under spinal anesthesia. She tolerated surgery well and was admitted for further medical management. Her knee has been painful for several years. She occasionally uses a cane for ambulation. She denies recent falls.     She has hx HTN, HLD and CAD. She is followed by Dr. Hou, cardiology, and received preop cardiac clearance.      Patient was provided pain medications as needed for pain control, along with adductor canal nerve block infusion of Ropivacaine.    Adjustments were made to pain medications to optimize postop pain management. Risks and benefits of opiate medications discussed with patient.    She was seen by PT and OT and has progressed well over her stay.  She used an IS for atelectasis prophylaxis and aspirin along with mechanicals for DVT prophylaxis.  Home medications were resumed as appropriate, and labs were monitored and remained fairly stable.     With the progress she has made, she is ready for DC home today.    She will have an Arrow pump ( instructed on it during this admit).    Discussed with patient regarding plan and she shows understanding and agreement.    Patient will have HHPT following discharge.      Procedures Performed  Procedure(s):  TOTAL KNEE ARTHROPLASTY LEFT    Landon Peck MD    Pertinent Test Results:    I reviewed the patient's new clinical results.  "  Results from last 7 days   Lab Units 10/01/19  0542   WBC 10*3/mm3 8.87   HEMOGLOBIN g/dL 12.0   HEMATOCRIT % 37.3   PLATELETS 10*3/mm3 181     Results from last 7 days   Lab Units 10/01/19  0542   SODIUM mmol/L 137   POTASSIUM mmol/L 3.9   CHLORIDE mmol/L 101   CO2 mmol/L 24.0   BUN mg/dL 13   CREATININE mg/dL 0.84   CALCIUM mg/dL 8.4*   GLUCOSE mg/dL 182*     I reviewed the patient's new imaging including images and reports.      Physical therapy: Patient ambulated 150 feet with RW and step through gait pattern, limited by pain. Extension AROM quite limited, lacking 18 degrees. Flexion AAROM 80 degrees in sitting. Patient climbed 1 step backwards with RW with no difficulty. Patient has been d/c home with HHPT.     Discharge Assessment:    Vital Signs  Visit Vitals  /52 (BP Location: Right arm, Patient Position: Lying)   Pulse 80   Temp 98.3 °F (36.8 °C) (Oral)   Resp 16   Ht 160 cm (63\")   Wt 87.1 kg (192 lb)   SpO2 93%   BMI 34.01 kg/m²     Temp (24hrs), Av.2 °F (36.8 °C), Min:97.7 °F (36.5 °C), Max:98.6 °F (37 °C)      General Appearance:    Alert, cooperative, in no acute distress   Lungs:     Clear to auscultation,respirations regular, even and                   unlabored    Heart:    Regular rhythm and normal rate, normal S1 and S2   Abdomen:     Normal bowel sounds, no masses, no organomegaly, soft        non-tender, non-distended, no guarding, no rebound                 tenderness   Extremities:   Moves all extremities well, no edema, no cyanosis, no              Redness. Left knee ACE wrap CDI. Nerve block present   Pulses:   Pulses palpable and equal bilaterally   Skin:   No bleeding, bruising or rash   Neurologic:   Cranial nerves 2 - 12 grossly intact, sensation intact. Flexion and dorsiflexion intact bilateral feet.       Discharge Disposition: Home    Discharge Medications     Discharge Medications      New Medications      Instructions Start Date   docusate sodium 100 MG capsule  Commonly " known as:  COLACE   100 mg, Oral, 2 Times Daily      HYDROcodone-acetaminophen 5-325 MG per tablet  Commonly known as:  NORCO   1 tablet, Oral, Every 4 Hours PRN         Changes to Medications      Instructions Start Date   aspirin 81 MG tablet  What changed:  additional instructions   81 mg, Oral, Daily, Resume in 1 month      aspirin 325 MG tablet  What changed:  You were already taking a medication with the same name, and this prescription was added. Make sure you understand how and when to take each.   325 mg, Oral, Daily, For 1 month   Start Date:  10/2/2019     MICHAEL BCISE 2 MG/0.85ML auto-injector injection  Generic drug:  exenatide er  What changed:  See the new instructions.   INJECT 0.85 ML UNDER THE SKIN INTO THE APPROPRIATE AREA AS DIRECTED ONCE PER WEEK.         Continue These Medications      Instructions Start Date   glucose blood test strip   FreeStyle Lite Strips   As Directed      ACCU-CHEK VIKRAM PLUS test strip  Generic drug:  glucose blood   TEST 1-2 TIMES DAILY      ACCU-CHEK SOFTCLIX LANCETS lancets   TEST 1  TIMES DAILY      carvedilol 6.25 MG tablet  Commonly known as:  COREG   1 tablet, Oral, 2 Times Daily, With morning and evening meal      ezetimibe 10 MG tablet  Commonly known as:  ZETIA   10 mg, Oral, Daily      lansoprazole 15 MG capsule  Commonly known as:  PREVACID   1 capsule, Oral, Daily      losartan 50 MG tablet  Commonly known as:  COZAAR   1 tablet, Oral, Daily      RANEXA 500 MG 12 hr tablet  Generic drug:  ranolazine   take 1 tablet by mouth twice a day      rosuvastatin 20 MG tablet  Commonly known as:  CRESTOR   1 tablet, Oral, Daily      vitamin D3 5000 units capsule capsule   5,000 Units, Oral, Daily         Stop These Medications    traMADol 50 MG tablet  Commonly known as:  ULTRAM            Discharge Diet: Consistent carb diet    Activity at Discharge: WBAT LLE    Follow-up Appointments  Dr. Peck per his orders      Billie Estrada, JACINTO  10/01/19  9:52 AM     I have  personally performed the evaluation on this patient. My history is consistant  with above documentation . My exam finding are listed above. I have personally reviewed and discussed the above formulated discharge plan with patient and Waqas

## 2019-10-02 LAB
ABO + RH BLD: NORMAL
ABO + RH BLD: NORMAL
BH BB BLOOD EXPIRATION DATE: NORMAL
BH BB BLOOD EXPIRATION DATE: NORMAL
BH BB BLOOD TYPE BARCODE: 5100
BH BB BLOOD TYPE BARCODE: 5100
BH BB DISPENSE STATUS: NORMAL
BH BB DISPENSE STATUS: NORMAL
BH BB PRODUCT CODE: NORMAL
BH BB PRODUCT CODE: NORMAL
BH BB UNIT NUMBER: NORMAL
BH BB UNIT NUMBER: NORMAL
UNIT  ABO: NORMAL
UNIT  ABO: NORMAL
UNIT  RH: NORMAL
UNIT  RH: NORMAL

## 2019-10-02 NOTE — PROGRESS NOTES
BOY Santos    Nerve Cath Post Op Call    Patient Name: Chayito Lua  :  1950  MRN:  [id number]  Date of Discharge: 10/1/2019    Nerve Cath Post Op Call:    Analgesia:Good  Pain Score:3/10  Side Effects:None  Catheter Site:clean  Patient Controlled ON Q pump infusion rate: 10ml/hr  Catheter Plan:Will continue with plan at home without changes and The patient was instructed to call ON CALL Anesthesia provider for any questions or problems    Pt states some leaking around the catheter site noted, but still getting good pain relief with it. Pt was advised to leave the catheter in place and to d/c it when Arrow pump gets empty. Pt stated understanding.

## 2019-10-03 NOTE — PROGRESS NOTES
BOY Santos    Nerve Cath Post Op Call    Patient Name: Chayito Lua  :  1950  MRN:  [id number]  Date of Discharge: 10/1/2019    Nerve Cath Post Op Call:    Catheter Plan:Patient/Family member report nerve catheter previously discontinued, tip intact

## 2019-11-20 ENCOUNTER — OFFICE VISIT (OUTPATIENT)
Dept: ENDOCRINOLOGY | Facility: CLINIC | Age: 69
End: 2019-11-20

## 2019-11-20 VITALS
DIASTOLIC BLOOD PRESSURE: 68 MMHG | WEIGHT: 184 LBS | BODY MASS INDEX: 32.6 KG/M2 | OXYGEN SATURATION: 97 % | HEIGHT: 63 IN | SYSTOLIC BLOOD PRESSURE: 140 MMHG | HEART RATE: 74 BPM

## 2019-11-20 DIAGNOSIS — E11.8 TYPE 2 DIABETES MELLITUS WITH COMPLICATION (HCC): Primary | ICD-10-CM

## 2019-11-20 LAB
GLUCOSE BLDC GLUCOMTR-MCNC: 132 MG/DL (ref 70–130)
HBA1C MFR BLD: 6.3 %

## 2019-11-20 PROCEDURE — 82947 ASSAY GLUCOSE BLOOD QUANT: CPT | Performed by: INTERNAL MEDICINE

## 2019-11-20 PROCEDURE — 83036 HEMOGLOBIN GLYCOSYLATED A1C: CPT | Performed by: INTERNAL MEDICINE

## 2019-11-20 PROCEDURE — 99213 OFFICE O/P EST LOW 20 MIN: CPT | Performed by: INTERNAL MEDICINE

## 2019-11-20 RX ORDER — TRAMADOL HYDROCHLORIDE 50 MG/1
TABLET ORAL
COMMUNITY
Start: 2019-10-21

## 2019-11-20 RX ORDER — GABAPENTIN 300 MG/1
CAPSULE ORAL
COMMUNITY
Start: 2019-10-18 | End: 2020-11-18

## 2019-11-20 NOTE — PROGRESS NOTES
Chief Complaint   Patient presents with   • Diabetes     DM 2 f/u      HPI:   Chayito Lua is a 69 y.o.female who returns to [facility] for f/u evaluation of of her Type 2 DM and h/o abnormal TFTs. Last clinic visit 05/29/2019. Her history is as follows:    Interim Events:   - Tolerating Bydureon  - Making dietary changes  - s/p right knee replacement 09/30/2019. On gabapentin 300 mg BID for post-op pain    1) Type 2 diabetes with associated complications of CAD and mild peripheral neuropathy:  - diagnosed in approximately 2000  - did not tolerate the Jardiance: caused repeat candidiasis  - metformin ER 1000 mg or 500 mg daily caused diarrhea    Current DM Medications:  - Bydureon 2 mg weekly    Glucometer Review: checking in AM and midday  Majority AM BGs < 150    DM Health Maintenance:  Ophtho: Last visit - 2019, neg for retinopathy per patient  Podiatry: Last visit - N/A  Monofilament / Foot Exam: due  Lipids: (05/2018) Tchol 146, , HDL 58, LDL 50 on Crestor 20 mg and Zetia 10 mg  Urine Microalb/Cr ratio: (05/2017), normal at 28.6 on an ARB  Aspirin: 81 mg daily  (05/2018) CMP - Cr 0.90, LFT's - WNL  (05/2018) CBC - Hgb 12.7    2) h/o abnormal TFT's:   - in 2012 pt was having various symptoms including fatigue and joint pain. She had been prescribed plaquenil and developed anemia due to the medication. To her knowledge, her TSH was never elevated at that time. However, she was prescribed liothyronine 5 mcg BID at that time by her cardiologist. At the time, she had significant fatigue that did improve. However, she overall felt better after stopping the plaquenil.  - I had her stop the cytomel in 12/2016 and she feels much better off the medication  - Labs checked since 2017 off the cytomel  Have shown normal thyroid function:    Lab Results   Component Value Date    TSH 1.780 05/29/2019         Review of Systems   Constitutional: Negative.         Mild weight loss   HENT: Negative.    Eyes: Negative.  "   Respiratory: Negative.    Cardiovascular: Negative for chest pain and palpitations.   Gastrointestinal: Negative.  Negative for constipation, diarrhea and nausea.   Endocrine: Negative.    Genitourinary: Negative.    Musculoskeletal: Positive for arthralgias.   Skin: Negative.    Neurological: Positive for numbness (bilateral great toe tingling).   Hematological: Negative.    Psychiatric/Behavioral: Negative.    All other systems reviewed and are negative.    The following portions of the patient's history were reviewed and updated as appropriate: allergies, current medications, past family history, past medical history, past social history, past surgical history and problem list.    /68   Pulse 74   Ht 160 cm (63\")   Wt 83.5 kg (184 lb)   SpO2 97%   BMI 32.59 kg/m²   Physical Exam   Constitutional: She is oriented to person, place, and time. She appears well-developed. No distress.   HENT:   Head: Normocephalic.   Mouth/Throat: Oropharynx is clear and moist.   Eyes: Conjunctivae and EOM are normal. Pupils are equal, round, and reactive to light.   Neck: No tracheal deviation present. No thyromegaly present.   No palpable thyroid nodules     Cardiovascular: Normal rate, regular rhythm and normal heart sounds.   No murmur heard.  Pulmonary/Chest: Effort normal and breath sounds normal. No respiratory distress.   Abdominal:   No scarring at injection sites     Musculoskeletal:   Using rolling walker to ambulate     Lymphadenopathy:     She has no cervical adenopathy.   Neurological: She is alert and oriented to person, place, and time. No cranial nerve deficit.   Skin: Skin is warm and dry. She is not diaphoretic. No erythema.   Psychiatric: She has a normal mood and affect. Her behavior is normal.   Vitals reviewed.    LABS/IMAGING: outside labs reviewed and summarized in HPI  Results for orders placed or performed in visit on 11/20/19   POC Glucose Fingerstick   Result Value Ref Range    Glucose 132 " (A) 70 - 130 mg/dL   POC Glycosylated Hemoglobin (Hb A1C)   Result Value Ref Range    Hemoglobin A1C 6.3 %     ASSESSMENT/PLAN:  1) Type 2 diabetes with associated complications of CAD and mild peripheral neuropathy: controlled, A1C% 6.3 today  - pt not tolerating Jardiance, not tolerating metformin ER even at lower doses  - Continue Bydureon 2 mg weekly   - Instructed pt to check his blood glucose pre-meal and 2-4 hour post meal three times a week, varying the meal each check. Example: Mon: pre- and post- BK, Wed: pre- and post- lunch, Fri: pre- and post- supper  - reviewed carb consistent meal planning    Pt had labs recently at PCP's office in August. Will obtain records.    RTC 6 months    .

## 2020-01-20 DIAGNOSIS — E11.8 TYPE 2 DIABETES MELLITUS WITH COMPLICATION, WITHOUT LONG-TERM CURRENT USE OF INSULIN (HCC): ICD-10-CM

## 2020-01-20 RX ORDER — BLOOD SUGAR DIAGNOSTIC
STRIP MISCELLANEOUS
Qty: 150 EACH | Refills: 3 | Status: SHIPPED | OUTPATIENT
Start: 2020-01-20 | End: 2021-10-20

## 2020-05-27 ENCOUNTER — OFFICE VISIT (OUTPATIENT)
Dept: ENDOCRINOLOGY | Facility: CLINIC | Age: 70
End: 2020-05-27

## 2020-05-27 DIAGNOSIS — E11.8 TYPE 2 DIABETES MELLITUS WITH COMPLICATION (HCC): Primary | ICD-10-CM

## 2020-05-27 PROCEDURE — 99442 PR PHYS/QHP TELEPHONE EVALUATION 11-20 MIN: CPT | Performed by: INTERNAL MEDICINE

## 2020-05-27 RX ORDER — EXENATIDE 2 MG/.85ML
2 INJECTION, SUSPENSION, EXTENDED RELEASE SUBCUTANEOUS WEEKLY
Qty: 12 PEN | Refills: 3 | Status: SHIPPED | OUTPATIENT
Start: 2020-05-27 | End: 2021-04-19 | Stop reason: DRUGHIGH

## 2020-05-27 RX ORDER — CELECOXIB 100 MG/1
100 CAPSULE ORAL 2 TIMES DAILY
COMMUNITY
End: 2020-11-18

## 2020-06-07 PROBLEM — G56.00 CARPAL TUNNEL SYNDROME: Status: ACTIVE | Noted: 2020-06-07

## 2020-06-07 PROBLEM — M17.0 DEGENERATIVE ARTHRITIS OF KNEE, BILATERAL: Status: ACTIVE | Noted: 2020-06-07

## 2020-06-07 PROBLEM — M19.049 PRIMARY OSTEOARTHRITIS OF HAND: Status: ACTIVE | Noted: 2020-06-07

## 2020-06-07 PROBLEM — M15.9 GENERALIZED OSTEOARTHRITIS: Status: ACTIVE | Noted: 2020-06-07

## 2020-06-12 ENCOUNTER — TELEPHONE (OUTPATIENT)
Dept: ENDOCRINOLOGY | Facility: CLINIC | Age: 70
End: 2020-06-12

## 2020-06-12 LAB
ALBUMIN SERPL-MCNC: 3.8 G/DL (ref 3.7–4.7)
ALBUMIN/CREAT UR: 16 MG/G CREAT (ref 0–29)
ALBUMIN/GLOB SERPL: 1.3 {RATIO} (ref 1.2–2.2)
ALP SERPL-CCNC: 73 IU/L (ref 39–117)
ALT SERPL-CCNC: 15 IU/L (ref 0–32)
AMBIG ABBREV CMP14 DEFAULT: NORMAL
AMBIG ABBREV LP DEFAULT: NORMAL
AST SERPL-CCNC: 22 IU/L (ref 0–40)
BILIRUB SERPL-MCNC: 0.5 MG/DL (ref 0–1.2)
BUN SERPL-MCNC: 18 MG/DL (ref 8–27)
BUN/CREAT SERPL: 20 (ref 12–28)
CALCIUM SERPL-MCNC: 9.1 MG/DL (ref 8.7–10.3)
CHLORIDE SERPL-SCNC: 100 MMOL/L (ref 96–106)
CHOLEST SERPL-MCNC: 147 MG/DL (ref 100–199)
CO2 SERPL-SCNC: 25 MMOL/L (ref 20–29)
CREAT SERPL-MCNC: 0.9 MG/DL (ref 0.57–1)
CREAT UR-MCNC: 133.4 MG/DL
ERYTHROCYTE [DISTWIDTH] IN BLOOD BY AUTOMATED COUNT: 13.5 % (ref 11.7–15.4)
GLOBULIN SER CALC-MCNC: 2.9 G/DL (ref 1.5–4.5)
GLUCOSE SERPL-MCNC: 135 MG/DL (ref 65–99)
HCT VFR BLD AUTO: 39.5 % (ref 34–46.6)
HDLC SERPL-MCNC: 62 MG/DL
HGB BLD-MCNC: 13.1 G/DL (ref 11.1–15.9)
LDLC SERPL CALC-MCNC: 53 MG/DL (ref 0–99)
MCH RBC QN AUTO: 29.2 PG (ref 26.6–33)
MCHC RBC AUTO-ENTMCNC: 33.2 G/DL (ref 31.5–35.7)
MCV RBC AUTO: 88 FL (ref 79–97)
MICROALBUMIN UR-MCNC: 20.8 UG/ML
PLATELET # BLD AUTO: 203 X10E3/UL (ref 150–450)
POTASSIUM SERPL-SCNC: 4.1 MMOL/L (ref 3.5–5.2)
PROT SERPL-MCNC: 6.7 G/DL (ref 6–8.5)
RBC # BLD AUTO: 4.48 X10E6/UL (ref 3.77–5.28)
SODIUM SERPL-SCNC: 141 MMOL/L (ref 134–144)
TRIGL SERPL-MCNC: 161 MG/DL (ref 0–149)
VLDLC SERPL CALC-MCNC: 32 MG/DL (ref 5–40)
WBC # BLD AUTO: 5.5 X10E3/UL (ref 3.4–10.8)

## 2020-06-12 NOTE — TELEPHONE ENCOUNTER
Spoke to patient about lab results. Also mailed copy of labs to patient.   Signed: Mirtha Salinas MD

## 2020-11-18 ENCOUNTER — OFFICE VISIT (OUTPATIENT)
Dept: ENDOCRINOLOGY | Facility: CLINIC | Age: 70
End: 2020-11-18

## 2020-11-18 VITALS
BODY MASS INDEX: 32.25 KG/M2 | OXYGEN SATURATION: 99 % | HEART RATE: 62 BPM | WEIGHT: 182 LBS | SYSTOLIC BLOOD PRESSURE: 122 MMHG | DIASTOLIC BLOOD PRESSURE: 78 MMHG | HEIGHT: 63 IN

## 2020-11-18 DIAGNOSIS — E11.8 TYPE 2 DIABETES MELLITUS WITH COMPLICATION (HCC): Primary | ICD-10-CM

## 2020-11-18 LAB
EXPIRATION DATE: NORMAL
EXPIRATION DATE: NORMAL
GLUCOSE BLDC GLUCOMTR-MCNC: 83 MG/DL (ref 70–130)
HBA1C MFR BLD: 5.8 %
Lab: NORMAL
Lab: NORMAL

## 2020-11-18 PROCEDURE — 82947 ASSAY GLUCOSE BLOOD QUANT: CPT | Performed by: INTERNAL MEDICINE

## 2020-11-18 PROCEDURE — 99213 OFFICE O/P EST LOW 20 MIN: CPT | Performed by: INTERNAL MEDICINE

## 2020-11-18 PROCEDURE — 83036 HEMOGLOBIN GLYCOSYLATED A1C: CPT | Performed by: INTERNAL MEDICINE

## 2021-04-19 ENCOUNTER — OFFICE VISIT (OUTPATIENT)
Dept: ENDOCRINOLOGY | Facility: CLINIC | Age: 71
End: 2021-04-19

## 2021-04-19 ENCOUNTER — LAB (OUTPATIENT)
Dept: LAB | Facility: HOSPITAL | Age: 71
End: 2021-04-19

## 2021-04-19 VITALS
DIASTOLIC BLOOD PRESSURE: 76 MMHG | SYSTOLIC BLOOD PRESSURE: 124 MMHG | HEIGHT: 63 IN | WEIGHT: 187 LBS | BODY MASS INDEX: 33.13 KG/M2 | HEART RATE: 70 BPM | OXYGEN SATURATION: 98 %

## 2021-04-19 DIAGNOSIS — R53.83 FATIGUE, UNSPECIFIED TYPE: ICD-10-CM

## 2021-04-19 DIAGNOSIS — E11.8 TYPE 2 DIABETES MELLITUS WITH COMPLICATION (HCC): Primary | ICD-10-CM

## 2021-04-19 LAB
ALBUMIN SERPL-MCNC: 4 G/DL (ref 3.5–5.2)
ALBUMIN UR-MCNC: 3.7 MG/DL
ALBUMIN/GLOB SERPL: 1.4 G/DL
ALP SERPL-CCNC: 71 U/L (ref 39–117)
ALT SERPL W P-5'-P-CCNC: 21 U/L (ref 1–33)
ANION GAP SERPL CALCULATED.3IONS-SCNC: 8.5 MMOL/L (ref 5–15)
AST SERPL-CCNC: 25 U/L (ref 1–32)
BILIRUB SERPL-MCNC: 0.5 MG/DL (ref 0–1.2)
BUN SERPL-MCNC: 17 MG/DL (ref 8–23)
BUN/CREAT SERPL: 18.3 (ref 7–25)
CALCIUM SPEC-SCNC: 9 MG/DL (ref 8.6–10.5)
CHLORIDE SERPL-SCNC: 102 MMOL/L (ref 98–107)
CHOLEST SERPL-MCNC: 142 MG/DL (ref 0–200)
CO2 SERPL-SCNC: 29.5 MMOL/L (ref 22–29)
CREAT SERPL-MCNC: 0.93 MG/DL (ref 0.57–1)
CREAT UR-MCNC: 180.9 MG/DL
DEPRECATED RDW RBC AUTO: 41.7 FL (ref 37–54)
ERYTHROCYTE [DISTWIDTH] IN BLOOD BY AUTOMATED COUNT: 12.9 % (ref 12.3–15.4)
EXPIRATION DATE: NORMAL
GFR SERPL CREATININE-BSD FRML MDRD: 59 ML/MIN/1.73
GLOBULIN UR ELPH-MCNC: 2.8 GM/DL
GLUCOSE BLDC GLUCOMTR-MCNC: 111 MG/DL (ref 70–130)
GLUCOSE SERPL-MCNC: 104 MG/DL (ref 65–99)
HBA1C MFR BLD: 6.1 %
HCT VFR BLD AUTO: 37.2 % (ref 34–46.6)
HDLC SERPL-MCNC: 66 MG/DL (ref 40–60)
HGB BLD-MCNC: 12.3 G/DL (ref 12–15.9)
LDLC SERPL CALC-MCNC: 57 MG/DL (ref 0–100)
LDLC/HDLC SERPL: 0.82 {RATIO}
Lab: NORMAL
MCH RBC QN AUTO: 29.4 PG (ref 26.6–33)
MCHC RBC AUTO-ENTMCNC: 33.1 G/DL (ref 31.5–35.7)
MCV RBC AUTO: 89 FL (ref 79–97)
MICROALBUMIN/CREAT UR: 20.5 MG/G
PLATELET # BLD AUTO: 226 10*3/MM3 (ref 140–450)
PMV BLD AUTO: 10.2 FL (ref 6–12)
POTASSIUM SERPL-SCNC: 4.6 MMOL/L (ref 3.5–5.2)
PROT SERPL-MCNC: 6.8 G/DL (ref 6–8.5)
RBC # BLD AUTO: 4.18 10*6/MM3 (ref 3.77–5.28)
SODIUM SERPL-SCNC: 140 MMOL/L (ref 136–145)
TRIGL SERPL-MCNC: 109 MG/DL (ref 0–150)
TSH SERPL DL<=0.05 MIU/L-ACNC: 1.82 UIU/ML (ref 0.27–4.2)
VLDLC SERPL-MCNC: 19 MG/DL (ref 5–40)
WBC # BLD AUTO: 5.41 10*3/MM3 (ref 3.4–10.8)

## 2021-04-19 PROCEDURE — 83036 HEMOGLOBIN GLYCOSYLATED A1C: CPT | Performed by: INTERNAL MEDICINE

## 2021-04-19 PROCEDURE — 80061 LIPID PANEL: CPT | Performed by: INTERNAL MEDICINE

## 2021-04-19 PROCEDURE — 85027 COMPLETE CBC AUTOMATED: CPT | Performed by: INTERNAL MEDICINE

## 2021-04-19 PROCEDURE — 82043 UR ALBUMIN QUANTITATIVE: CPT | Performed by: INTERNAL MEDICINE

## 2021-04-19 PROCEDURE — 80053 COMPREHEN METABOLIC PANEL: CPT | Performed by: INTERNAL MEDICINE

## 2021-04-19 PROCEDURE — 82570 ASSAY OF URINE CREATININE: CPT | Performed by: INTERNAL MEDICINE

## 2021-04-19 PROCEDURE — 84443 ASSAY THYROID STIM HORMONE: CPT | Performed by: INTERNAL MEDICINE

## 2021-04-19 PROCEDURE — 82607 VITAMIN B-12: CPT | Performed by: INTERNAL MEDICINE

## 2021-04-19 PROCEDURE — 82947 ASSAY GLUCOSE BLOOD QUANT: CPT | Performed by: INTERNAL MEDICINE

## 2021-04-19 PROCEDURE — 99213 OFFICE O/P EST LOW 20 MIN: CPT | Performed by: INTERNAL MEDICINE

## 2021-04-19 RX ORDER — DULAGLUTIDE 0.75 MG/.5ML
0.75 INJECTION, SOLUTION SUBCUTANEOUS WEEKLY
Qty: 4 PEN | Refills: 0
Start: 2021-04-19 | End: 2021-06-07 | Stop reason: SDUPTHER

## 2021-04-19 NOTE — PROGRESS NOTES
Chief Complaint   Patient presents with   • Diabetes     follow up     HPI:   Chayito Lua is a 71 y.o.female who presents to [facility] for f/u evaluation of of her Type 2 DM. Last visit 11/18/2020. Her history is as follows:    Interim Events:   - has increased fatigue since last visit    1) Type 2 diabetes with associated complications of CAD and mild peripheral neuropathy:  - diagnosed in approximately 2000  - did not tolerate the Jardiance: caused repeat candidiasis  - metformin ER 1000 mg or 500 mg daily caused diarrhea    Current DM Medications:  - Bydureon 2 mg weekly    Glucometer Review: checking in AM and midday  Majority AM BGs < 150 per pt report    DM Health Maintenance:  Ophtho: Last visit - 2019, neg for retinopathy per patient  Podiatry: Last visit - N/A  Monofilament / Foot Exam: due  Lipids: (04/2021) Tchol 142, , HDL 66, LDL 57 on Crestor 20 mg and Zetia 10 mg  Urine Microalb/Cr ratio: (04/2021), normal at 20.5 on an ARB  Aspirin: 81 mg daily  (04/2021) CMP - Cr 0.93, GFR 59, LFT's - WNL  (04/2021) CBC - Hgb 12.3  (04/2021) vit B 12 - 238    2) h/o abnormal TFT's:   - in 2012 pt was having various symptoms including fatigue and joint pain. She had been prescribed plaquenil and developed anemia due to the medication. To her knowledge, her TSH was never elevated at that time. However, she was prescribed liothyronine 5 mcg BID at that time by her cardiologist. At the time, she had significant fatigue that did improve. However, she overall felt better after stopping the plaquenil.  - I had her stop the cytomel in 12/2016 and she felt much better off the medication  - Labs checked since 2017 off the cytomel  Have shown normal thyroid function:    Lab Results   Component Value Date    TSH 1.820 04/19/2021       Review of Systems   Constitutional: Positive for fatigue.        Weight gain, mild   HENT: Negative.    Eyes: Negative.    Respiratory: Negative.    Cardiovascular: Negative for  "chest pain and palpitations.   Gastrointestinal: Negative.  Negative for constipation, diarrhea and nausea.   Endocrine: Negative.    Genitourinary: Negative.    Musculoskeletal: Positive for arthralgias.   Skin: Negative.    Neurological: Positive for numbness (bilateral great toe tingling).   Hematological: Negative.    Psychiatric/Behavioral: Negative.    All other systems reviewed and are negative.    The following portions of the patient's history were reviewed and updated as appropriate: allergies, current medications, past family history, past medical history, past social history, past surgical history and problem list.    /76   Pulse 70   Ht 160 cm (63\")   Wt 84.8 kg (187 lb)   SpO2 98%   BMI 33.13 kg/m²   Physical Exam   Constitutional: She is oriented to person, place, and time. She appears well-developed. No distress.   HENT:   Head: Normocephalic.   Eyes: Pupils are equal, round, and reactive to light. Conjunctivae are normal.   Neck: No tracheal deviation present. No thyromegaly present.   No palpable thyroid nodules     Cardiovascular: Normal rate, regular rhythm and normal heart sounds.   No murmur heard.  Pulmonary/Chest: Effort normal and breath sounds normal. No respiratory distress.   Abdominal:   No scarring at injection sites     Musculoskeletal:      Comments:      Lymphadenopathy:     She has no cervical adenopathy.   Neurological: She is alert and oriented to person, place, and time. No cranial nerve deficit.   Skin: Skin is warm and dry. She is not diaphoretic. No erythema.   Psychiatric: Her behavior is normal.   Vitals reviewed.       LABS/IMAGING: outside labs reviewed and summarized in HPI  Results for orders placed or performed in visit on 04/19/21   CBC (No Diff)    Specimen: Blood   Result Value Ref Range    WBC 5.41 3.40 - 10.80 10*3/mm3    RBC 4.18 3.77 - 5.28 10*6/mm3    Hemoglobin 12.3 12.0 - 15.9 g/dL    Hematocrit 37.2 34.0 - 46.6 %    MCV 89.0 79.0 - 97.0 fL    MCH " 29.4 26.6 - 33.0 pg    MCHC 33.1 31.5 - 35.7 g/dL    RDW 12.9 12.3 - 15.4 %    RDW-SD 41.7 37.0 - 54.0 fl    MPV 10.2 6.0 - 12.0 fL    Platelets 226 140 - 450 10*3/mm3   Comprehensive Metabolic Panel    Specimen: Blood   Result Value Ref Range    Glucose 104 (H) 65 - 99 mg/dL    BUN 17 8 - 23 mg/dL    Creatinine 0.93 0.57 - 1.00 mg/dL    Sodium 140 136 - 145 mmol/L    Potassium 4.6 3.5 - 5.2 mmol/L    Chloride 102 98 - 107 mmol/L    CO2 29.5 (H) 22.0 - 29.0 mmol/L    Calcium 9.0 8.6 - 10.5 mg/dL    Total Protein 6.8 6.0 - 8.5 g/dL    Albumin 4.00 3.50 - 5.20 g/dL    ALT (SGPT) 21 1 - 33 U/L    AST (SGOT) 25 1 - 32 U/L    Alkaline Phosphatase 71 39 - 117 U/L    Total Bilirubin 0.5 0.0 - 1.2 mg/dL    eGFR Non African Amer 59 (L) >60 mL/min/1.73    Globulin 2.8 gm/dL    A/G Ratio 1.4 g/dL    BUN/Creatinine Ratio 18.3 7.0 - 25.0    Anion Gap 8.5 5.0 - 15.0 mmol/L   Lipid Panel    Specimen: Blood   Result Value Ref Range    Total Cholesterol 142 0 - 200 mg/dL    Triglycerides 109 0 - 150 mg/dL    HDL Cholesterol 66 (H) 40 - 60 mg/dL    LDL Cholesterol  57 0 - 100 mg/dL    VLDL Cholesterol 19 5 - 40 mg/dL    LDL/HDL Ratio 0.82    Microalbumin / Creatinine Urine Ratio - Urine, Clean Catch    Specimen: Urine, Clean Catch   Result Value Ref Range    Microalbumin/Creatinine Ratio 20.5 mg/g    Creatinine, Urine 180.9 mg/dL    Microalbumin, Urine 3.7 mg/dL   TSH    Specimen: Blood   Result Value Ref Range    TSH 1.820 0.270 - 4.200 uIU/mL   Vitamin B12    Specimen: Blood   Result Value Ref Range    Vitamin B-12 238 211 - 946 pg/mL   POC Glucose, Blood    Specimen: Blood   Result Value Ref Range    Glucose 111 70 - 130 mg/dL    Lot Number [id number]     Expiration Date 12/15/2021    POC Glycosylated Hemoglobin (Hb A1C)    Specimen: Blood   Result Value Ref Range    Hemoglobin A1C 6.1 %     ASSESSMENT/PLAN:  1) Type 2 diabetes with associated complications of CAD and mild peripheral neuropathy: controlled, A1C% 6.1 today  - pt has  not tolerated Jardiance or metformin ER even at lower doses  - Will D/C Bydureon. Will change to Trulicity 0.75 mg weekly given it's cardiovascular benefit. Sample pens given to pt. Instructed pt to call for Rx if tolerated.  - Instructed pt to check his blood glucose pre-meal and 2-4 hour post meal three times a week, varying the meal each check. Example: Mon: pre- and post- BK, Wed: pre- and post- lunch, Fri: pre- and post- supper  - reviewed carb consistent meal planning    DM health maintenance labs completed completed today and reviewed.    2) fatigue, unspecified:  - TSH, vit B 12 level checked today  - TSH was normal. B12 level was at the lower limit of normal  - advised pt to try OTC vitamin B12 1000 mcg daily    RTC 6 months    Signed: Mirtha Salinas MD        .

## 2021-04-20 LAB — VIT B12 BLD-MCNC: 238 PG/ML (ref 211–946)

## 2021-04-28 DIAGNOSIS — E11.8 TYPE 2 DIABETES MELLITUS WITH COMPLICATION (HCC): ICD-10-CM

## 2021-04-28 RX ORDER — EXENATIDE 2 MG/.85ML
INJECTION, SUSPENSION, EXTENDED RELEASE SUBCUTANEOUS
Qty: 10.2 ML | Refills: 3 | OUTPATIENT
Start: 2021-04-28

## 2021-06-07 ENCOUNTER — TELEPHONE (OUTPATIENT)
Dept: ENDOCRINOLOGY | Facility: CLINIC | Age: 71
End: 2021-06-07

## 2021-06-07 DIAGNOSIS — E11.8 TYPE 2 DIABETES MELLITUS WITH COMPLICATION (HCC): ICD-10-CM

## 2021-06-07 RX ORDER — DULAGLUTIDE 0.75 MG/.5ML
0.75 INJECTION, SOLUTION SUBCUTANEOUS WEEKLY
Qty: 4 PEN | Refills: 5
Start: 2021-06-07 | End: 2021-06-16 | Stop reason: SDUPTHER

## 2021-06-16 RX ORDER — DULAGLUTIDE 0.75 MG/.5ML
0.75 INJECTION, SOLUTION SUBCUTANEOUS WEEKLY
Qty: 12 PEN | Refills: 3 | Status: SHIPPED | OUTPATIENT
Start: 2021-06-16 | End: 2022-04-13

## 2021-06-16 NOTE — TELEPHONE ENCOUNTER
PATIENTS PHARMACY HAS NOT RECEIVED PRESCRIPTION FOR TRULICITY. SHE NEEDS US TO RESEND PRESCRIPTION AND WILL NEED SAMPLES TILL SHE CAN GET THIS PRESCRIPTION. SHE IS OUT OF THE SAMPLES WE GAVE HER. PATIENTS NUMBER [phone]

## 2021-10-20 ENCOUNTER — OFFICE VISIT (OUTPATIENT)
Dept: ENDOCRINOLOGY | Facility: CLINIC | Age: 71
End: 2021-10-20

## 2021-10-20 VITALS
BODY MASS INDEX: 34.55 KG/M2 | OXYGEN SATURATION: 96 % | SYSTOLIC BLOOD PRESSURE: 122 MMHG | HEIGHT: 63 IN | WEIGHT: 195 LBS | HEART RATE: 72 BPM | DIASTOLIC BLOOD PRESSURE: 74 MMHG

## 2021-10-20 DIAGNOSIS — E11.8 TYPE 2 DIABETES MELLITUS WITH COMPLICATION, WITHOUT LONG-TERM CURRENT USE OF INSULIN (HCC): Primary | ICD-10-CM

## 2021-10-20 LAB
EXPIRATION DATE: ABNORMAL
EXPIRATION DATE: NORMAL
GLUCOSE BLDC GLUCOMTR-MCNC: 222 MG/DL (ref 70–130)
HBA1C MFR BLD: 6.2 %
Lab: ABNORMAL
Lab: NORMAL

## 2021-10-20 PROCEDURE — 83036 HEMOGLOBIN GLYCOSYLATED A1C: CPT | Performed by: INTERNAL MEDICINE

## 2021-10-20 PROCEDURE — 99213 OFFICE O/P EST LOW 20 MIN: CPT | Performed by: INTERNAL MEDICINE

## 2021-10-20 PROCEDURE — 82947 ASSAY GLUCOSE BLOOD QUANT: CPT | Performed by: INTERNAL MEDICINE

## 2021-10-20 PROCEDURE — 3044F HG A1C LEVEL LT 7.0%: CPT | Performed by: INTERNAL MEDICINE

## 2021-10-20 RX ORDER — LANCETS
1 EACH MISCELLANEOUS 2 TIMES DAILY
Qty: 200 EACH | Refills: 3 | Status: SHIPPED | OUTPATIENT
Start: 2021-10-20

## 2021-10-20 RX ORDER — BLOOD SUGAR DIAGNOSTIC
1 STRIP MISCELLANEOUS 2 TIMES DAILY
Qty: 200 EACH | Refills: 3 | Status: SHIPPED | OUTPATIENT
Start: 2021-10-20

## 2021-10-20 RX ORDER — LOTEPREDNOL ETABONATE 10 MG/ML
SUSPENSION TOPICAL
COMMUNITY
End: 2022-04-20

## 2021-10-20 RX ORDER — CICLOPIROX 7.7 MG/G
1 GEL TOPICAL 2 TIMES DAILY
COMMUNITY
End: 2022-04-20

## 2021-10-20 NOTE — PROGRESS NOTES
Chief Complaint   Patient presents with   • Diabetes     follow up      HPI:   Chayito Lua is a 71 y.o.female who presents to [facility] Clinic for f/u evaluation of of her Type 2 DM. Last visit 04/19/2021. Her history is as follows:    Interim Events:   - Reports fatigue better since last visit after starting a Vitamin B12 supplement    1) Type 2 diabetes with associated complications of CAD and mild peripheral neuropathy:  - diagnosed in approximately 2000  - did not tolerate the Jardiance: caused repeat candidiasis  - metformin ER 1000 mg or 500 mg daily caused diarrhea    Current DM Medications:  - Trulicity 0.75 mg weekly    Glucometer Review: checking in AM and midday  Majority AM BGs < 150     DM Health Maintenance:  Ophtho: Last visit - 2019, neg for retinopathy per patient  Podiatry: Last visit - N/A  Monofilament / Foot Exam: due  Lipids: (04/2021) Tchol 142, , HDL 66, LDL 57 on Crestor 20 mg and Zetia 10 mg  Urine Microalb/Cr ratio: (04/2021), normal at 20.5 on an ARB  Aspirin: 81 mg daily  (04/2021) CMP - Cr 0.93, GFR 59, LFT's - WNL  (04/2021) CBC - Hgb 12.3  (04/2021) vit B 12 - 238    2) h/o abnormal TFT's:   - in 2012 pt was having various symptoms including fatigue and joint pain. She had been prescribed plaquenil and developed anemia due to the medication. To her knowledge, her TSH was never elevated at that time. However, she was prescribed liothyronine 5 mcg BID at that time by her cardiologist. At the time, she had significant fatigue that did improve. However, she overall felt better after stopping the plaquenil.  - I had her stop the cytomel in 12/2016 and she felt much better off the medication  - Labs checked since 2017 off the cytomel  Have shown normal thyroid function:    Lab Results   Component Value Date    TSH 1.820 04/19/2021     Review of Systems   Constitutional: Positive for fatigue (better).        Weight gain, mild   HENT: Negative.    Eyes: Negative.    Respiratory:  "Negative.    Cardiovascular: Negative for chest pain and palpitations.   Gastrointestinal: Negative.  Negative for constipation, diarrhea and nausea.   Endocrine: Negative.    Genitourinary: Negative.    Musculoskeletal: Positive for arthralgias.   Skin: Negative.    Neurological: Positive for numbness (bilateral great toe tingling).   Hematological: Negative.    Psychiatric/Behavioral: Negative.    All other systems reviewed and are negative.    The following portions of the patient's history were reviewed and updated as appropriate: allergies, current medications, past family history, past medical history, past social history, past surgical history and problem list.      /74   Pulse 72   Ht 160 cm (63\")   Wt 88.5 kg (195 lb)   SpO2 96%   BMI 34.54 kg/m²   Physical Exam   Constitutional: She is oriented to person, place, and time. She appears well-developed. No distress.   HENT:   Head: Normocephalic.   Eyes: Pupils are equal, round, and reactive to light. Conjunctivae are normal.   Neck: No tracheal deviation present. No thyromegaly present.   No palpable thyroid nodules     Cardiovascular: Normal rate, regular rhythm and normal heart sounds.   No murmur heard.  Pulmonary/Chest: Effort normal and breath sounds normal. No respiratory distress.   Abdominal:   No scarring at injection sites     Musculoskeletal:      Comments:      Lymphadenopathy:     She has no cervical adenopathy.   Neurological: She is alert and oriented to person, place, and time. No cranial nerve deficit.   Skin: Skin is warm and dry. She is not diaphoretic. No erythema.   Psychiatric: Her behavior is normal.   Vitals reviewed.       LABS/IMAGING: outside labs reviewed and summarized in HPI  Results for orders placed or performed in visit on 10/20/21   POC Glucose, Blood    Specimen: Blood   Result Value Ref Range    Glucose 222 (A) 70 - 130 mg/dL    Lot Number [id number]     Expiration Date 04/14/22    POC Glycosylated Hemoglobin (Hb " A1C)    Specimen: Blood   Result Value Ref Range    Hemoglobin A1C 6.2 %    Lot Number [id number]     Expiration Date 06/01/23      ASSESSMENT/PLAN:  1) Type 2 diabetes with associated complications of CAD and mild peripheral neuropathy: controlled, A1C% 6.2 today  - pt has not tolerated Jardiance or metformin ER even at lower doses  - Will continue Trulicity 0.75 mg weekly given it's cardiovascular benefit.   - Instructed pt to check his blood glucose pre-meal and 2-4 hour post meal three times a week, varying the meal each check. Example: Mon: pre- and post- BK, Wed: pre- and post- lunch, Fri: pre- and post- supper  - reviewed carb consistent meal planning    DM health maintenance labs completed 04/2021 reviewed.    RTC 6 months    Signed: Mirtha Salinas MD        .

## 2022-04-13 DIAGNOSIS — E11.8 TYPE 2 DIABETES MELLITUS WITH COMPLICATION: ICD-10-CM

## 2022-04-13 RX ORDER — DULAGLUTIDE 0.75 MG/.5ML
INJECTION, SOLUTION SUBCUTANEOUS
Qty: 6 ML | Refills: 3 | Status: SHIPPED | OUTPATIENT
Start: 2022-04-13 | End: 2023-03-15

## 2022-04-20 ENCOUNTER — OFFICE VISIT (OUTPATIENT)
Dept: ENDOCRINOLOGY | Facility: CLINIC | Age: 72
End: 2022-04-20

## 2022-04-20 VITALS
HEART RATE: 67 BPM | DIASTOLIC BLOOD PRESSURE: 68 MMHG | BODY MASS INDEX: 32.96 KG/M2 | WEIGHT: 186 LBS | OXYGEN SATURATION: 96 % | SYSTOLIC BLOOD PRESSURE: 122 MMHG | HEIGHT: 63 IN

## 2022-04-20 DIAGNOSIS — E11.8 TYPE 2 DIABETES MELLITUS WITH COMPLICATION: Primary | ICD-10-CM

## 2022-04-20 PROBLEM — M15.9 GENERALIZED OSTEOARTHRITIS: Status: ACTIVE | Noted: 2019-05-23

## 2022-04-20 PROBLEM — M17.11 OSTEOARTHRITIS OF RIGHT PATELLOFEMORAL JOINT: Status: ACTIVE | Noted: 2019-05-23

## 2022-04-20 LAB
GLUCOSE BLDC GLUCOMTR-MCNC: 145 MG/DL (ref 70–130)
HBA1C MFR BLD: 6.7 %

## 2022-04-20 PROCEDURE — 99213 OFFICE O/P EST LOW 20 MIN: CPT | Performed by: INTERNAL MEDICINE

## 2022-04-20 PROCEDURE — 82947 ASSAY GLUCOSE BLOOD QUANT: CPT | Performed by: INTERNAL MEDICINE

## 2022-06-10 ENCOUNTER — TELEPHONE (OUTPATIENT)
Dept: ENDOCRINOLOGY | Facility: CLINIC | Age: 72
End: 2022-06-10

## 2022-09-12 ENCOUNTER — TELEPHONE (OUTPATIENT)
Dept: ENDOCRINOLOGY | Facility: CLINIC | Age: 72
End: 2022-09-12

## 2022-09-12 NOTE — TELEPHONE ENCOUNTER
SHAREE WITH [employer] IS NEEDING DIAGNOSIS FOR PATIENTS TRULICITY PRESCRIPTION ANDN NEEDS PRE TREATMENT A1 C LEVELS IN ORDER TO PROCESS PRESCRIPTION. PHONE NUMBER [phone]

## 2022-10-26 ENCOUNTER — LAB (OUTPATIENT)
Dept: LAB | Facility: HOSPITAL | Age: 72
End: 2022-10-26

## 2022-10-26 ENCOUNTER — OFFICE VISIT (OUTPATIENT)
Dept: ENDOCRINOLOGY | Facility: CLINIC | Age: 72
End: 2022-10-26

## 2022-10-26 VITALS
BODY MASS INDEX: 31.36 KG/M2 | HEIGHT: 63 IN | HEART RATE: 62 BPM | WEIGHT: 177 LBS | SYSTOLIC BLOOD PRESSURE: 136 MMHG | OXYGEN SATURATION: 96 % | DIASTOLIC BLOOD PRESSURE: 70 MMHG

## 2022-10-26 DIAGNOSIS — E11.8 TYPE 2 DIABETES MELLITUS WITH COMPLICATION: Primary | ICD-10-CM

## 2022-10-26 DIAGNOSIS — E78.2 MIXED HYPERLIPIDEMIA: ICD-10-CM

## 2022-10-26 LAB
ALBUMIN SERPL-MCNC: 3.8 G/DL (ref 3.5–5.2)
ALBUMIN/GLOB SERPL: 1.2 G/DL
ALP SERPL-CCNC: 92 U/L (ref 39–117)
ALT SERPL W P-5'-P-CCNC: 56 U/L (ref 1–33)
ANION GAP SERPL CALCULATED.3IONS-SCNC: 10 MMOL/L (ref 5–15)
AST SERPL-CCNC: 49 U/L (ref 1–32)
BILIRUB SERPL-MCNC: 0.5 MG/DL (ref 0–1.2)
BUN SERPL-MCNC: 19 MG/DL (ref 8–23)
BUN/CREAT SERPL: 18.3 (ref 7–25)
CALCIUM SPEC-SCNC: 9.3 MG/DL (ref 8.6–10.5)
CHLORIDE SERPL-SCNC: 103 MMOL/L (ref 98–107)
CHOLEST SERPL-MCNC: 155 MG/DL (ref 0–200)
CO2 SERPL-SCNC: 29 MMOL/L (ref 22–29)
CREAT SERPL-MCNC: 1.04 MG/DL (ref 0.57–1)
DEPRECATED RDW RBC AUTO: 42.7 FL (ref 37–54)
EGFRCR SERPLBLD CKD-EPI 2021: 56.5 ML/MIN/1.73
ERYTHROCYTE [DISTWIDTH] IN BLOOD BY AUTOMATED COUNT: 12.9 % (ref 12.3–15.4)
EXPIRATION DATE: NORMAL
GLOBULIN UR ELPH-MCNC: 3.1 GM/DL
GLUCOSE BLDC GLUCOMTR-MCNC: 174 MG/DL (ref 70–130)
GLUCOSE SERPL-MCNC: 121 MG/DL (ref 65–99)
HBA1C MFR BLD: 6.1 %
HCT VFR BLD AUTO: 40.3 % (ref 34–46.6)
HDLC SERPL-MCNC: 72 MG/DL (ref 40–60)
HGB BLD-MCNC: 13.3 G/DL (ref 12–15.9)
LDLC SERPL CALC-MCNC: 61 MG/DL (ref 0–100)
LDLC/HDLC SERPL: 0.79 {RATIO}
Lab: NORMAL
MCH RBC QN AUTO: 30.1 PG (ref 26.6–33)
MCHC RBC AUTO-ENTMCNC: 33 G/DL (ref 31.5–35.7)
MCV RBC AUTO: 91.2 FL (ref 79–97)
PLATELET # BLD AUTO: 195 10*3/MM3 (ref 140–450)
PMV BLD AUTO: 9.9 FL (ref 6–12)
POTASSIUM SERPL-SCNC: 4 MMOL/L (ref 3.5–5.2)
PROT SERPL-MCNC: 6.9 G/DL (ref 6–8.5)
RBC # BLD AUTO: 4.42 10*6/MM3 (ref 3.77–5.28)
SODIUM SERPL-SCNC: 142 MMOL/L (ref 136–145)
TRIGL SERPL-MCNC: 130 MG/DL (ref 0–150)
TSH SERPL DL<=0.05 MIU/L-ACNC: 1.69 UIU/ML (ref 0.27–4.2)
VLDLC SERPL-MCNC: 22 MG/DL (ref 5–40)
WBC NRBC COR # BLD: 6.48 10*3/MM3 (ref 3.4–10.8)

## 2022-10-26 PROCEDURE — 83036 HEMOGLOBIN GLYCOSYLATED A1C: CPT | Performed by: INTERNAL MEDICINE

## 2022-10-26 PROCEDURE — 82570 ASSAY OF URINE CREATININE: CPT | Performed by: INTERNAL MEDICINE

## 2022-10-26 PROCEDURE — 84443 ASSAY THYROID STIM HORMONE: CPT | Performed by: INTERNAL MEDICINE

## 2022-10-26 PROCEDURE — 82607 VITAMIN B-12: CPT | Performed by: INTERNAL MEDICINE

## 2022-10-26 PROCEDURE — 80053 COMPREHEN METABOLIC PANEL: CPT | Performed by: INTERNAL MEDICINE

## 2022-10-26 PROCEDURE — 3044F HG A1C LEVEL LT 7.0%: CPT | Performed by: INTERNAL MEDICINE

## 2022-10-26 PROCEDURE — 80061 LIPID PANEL: CPT | Performed by: INTERNAL MEDICINE

## 2022-10-26 PROCEDURE — 82043 UR ALBUMIN QUANTITATIVE: CPT | Performed by: INTERNAL MEDICINE

## 2022-10-26 PROCEDURE — 82947 ASSAY GLUCOSE BLOOD QUANT: CPT | Performed by: INTERNAL MEDICINE

## 2022-10-26 PROCEDURE — 85027 COMPLETE CBC AUTOMATED: CPT | Performed by: INTERNAL MEDICINE

## 2022-10-26 PROCEDURE — 99213 OFFICE O/P EST LOW 20 MIN: CPT | Performed by: INTERNAL MEDICINE

## 2022-10-27 LAB
ALBUMIN UR-MCNC: 1.5 MG/DL
CREAT UR-MCNC: 142.5 MG/DL
MICROALBUMIN/CREAT UR: 10.5 MG/G
VIT B12 BLD-MCNC: 1739 PG/ML (ref 211–946)

## 2023-03-15 DIAGNOSIS — E11.8 TYPE 2 DIABETES MELLITUS WITH COMPLICATION: ICD-10-CM

## 2023-03-15 RX ORDER — DULAGLUTIDE 0.75 MG/.5ML
INJECTION, SOLUTION SUBCUTANEOUS
Qty: 6 ML | Refills: 3 | Status: SHIPPED | OUTPATIENT
Start: 2023-03-15

## 2023-04-26 ENCOUNTER — OFFICE VISIT (OUTPATIENT)
Dept: ENDOCRINOLOGY | Facility: CLINIC | Age: 73
End: 2023-04-26
Payer: MEDICARE

## 2023-04-26 VITALS
WEIGHT: 164 LBS | HEART RATE: 68 BPM | SYSTOLIC BLOOD PRESSURE: 126 MMHG | BODY MASS INDEX: 29.06 KG/M2 | HEIGHT: 63 IN | OXYGEN SATURATION: 96 % | DIASTOLIC BLOOD PRESSURE: 70 MMHG

## 2023-04-26 DIAGNOSIS — E11.8 TYPE 2 DIABETES MELLITUS WITH COMPLICATION: Primary | ICD-10-CM

## 2023-04-26 DIAGNOSIS — E78.2 MIXED HYPERLIPIDEMIA: ICD-10-CM

## 2023-04-26 LAB
EXPIRATION DATE: NORMAL
GLUCOSE BLDC GLUCOMTR-MCNC: 81 MG/DL (ref 70–130)
HBA1C MFR BLD: 5.4 %
Lab: NORMAL

## 2023-04-26 NOTE — PROGRESS NOTES
"Chief Complaint   Patient presents with   • Diabetes     Follow up      HPI:   Chayito Lua is a 73 y.o.female who presents to [facility]ocrine Clinic for f/u evaluation of of her Type 2 DM. Last visit 10/26/2022. Her history is as follows:    Interim Events:   - On 01/26/2023, pt s/p left femur fracture. Had surgery at [facility]. Pt developed transient neurologic symptoms (rt facial droop and difficulty speaking) post-operatively and was transferred to [facility]. Imaging showed a \"non-occlusive left M2 thrombus\" and a \"an evolving acute lacunar infarct in the left caudate head\".  \"TPA not given as she presented outside the treatment window. Mechanical thrombectomy not performed as there was no large vessel occlusion on imaging and symptoms had resolved. Mechanism id likely TIA versus small self-resolving stroke in the setting of hypercoagulability with recent hip surgery.\"  - pt started on ASA 81 mg. Pt was already on rosuvastatin 20 mg + zetia 10 mg   - pt wore a holter monitor in 03/2023 that showed no atrial fibrillation or other dysrhythmia  - Is tolerating Trulicity  - Dr. Gallo at [facility] is managing her osteoporosis.      1) Type 2 diabetes with associated complications of CAD, mild CKD, and mild peripheral neuropathy:  - diagnosed in approximately 2000  - did not tolerate the Jardiance: caused repeat candidiasis  - metformin ER 1000 mg or 500 mg daily caused diarrhea    Current DM Medications:  - Trulicity 0.75 mg weekly: tolerating    Glucometer Review: reports checking in AM and midday  Majority AM BGs < 150 per her report    DM Health Maintenance:  Ophtho: Last visit - 2022, neg for retinopathy per patient  Podiatry: Last visit - N/A  Monofilament / Foot Exam: 10/2022  Lipids: (01/2023) Tchol 122, , HDL 43, LDL 48.2 on Crestor 20 mg and Zetia 10 mg   Urine Microalb/Cr ratio: (10/2022), normal at 10.5 on an ARB  Aspirin: 81 mg daily  (10/2022) CMP - Cr 1.04, eGFR 56,5  (01/2023) " "CBC - Hgb 10.4  (10/2022) vit B 12 - 1,739    2) h/o abnormal TFT's:  Now resolved  - in 2012 pt was having various symptoms including fatigue and joint pain. She had been prescribed plaquenil and developed anemia due to the medication. To her knowledge, her TSH was never elevated at that time. However, she was prescribed liothyronine 5 mcg BID at that time by her cardiologist. At the time, she had significant fatigue that did improve. However, she overall felt better after stopping the plaquenil.  - I had her stop the cytomel in 12/2016 and she felt much better off the medication  - Labs checked since 2017 off the cytomel  Have shown normal thyroid function:    Lab Results   Component Value Date    TSH 1.690 10/26/2022     Review of Systems   Constitutional: Positive for fatigue.        Weight loss, 13 lbs since last visit   HENT: Negative.    Eyes: Negative.    Respiratory: Negative.    Cardiovascular: Negative for chest pain and palpitations.   Gastrointestinal: Negative.  Negative for constipation, diarrhea and nausea.   Endocrine: Negative.    Genitourinary: Negative.    Musculoskeletal: Positive for arthralgias and gait problem.   Skin: Negative.    Neurological: Negative for numbness.   Hematological: Negative.    Psychiatric/Behavioral: Negative.    All other systems reviewed and are negative.    The following portions of the patient's history were reviewed and updated as appropriate: allergies, current medications, past family history, past medical history, past social history, past surgical history and problem list.      /70   Pulse 68   Ht 160 cm (63\")   Wt 74.4 kg (164 lb)   SpO2 96%   BMI 29.05 kg/m²   Physical Exam   Constitutional: She is oriented to person, place, and time. She appears well-developed. No distress.   HENT:   Head: Normocephalic.   Eyes: Pupils are equal, round, and reactive to light. Conjunctivae are normal.   Neck: No tracheal deviation present. No thyromegaly present. "   No palpable thyroid nodules     Cardiovascular: Normal rate, regular rhythm and normal heart sounds.   No murmur heard.  Pulmonary/Chest: Effort normal and breath sounds normal. No respiratory distress.   Abdominal:   No scarring at injection sites     Musculoskeletal:      Comments: Using Rolling Walker   Lymphadenopathy:     She has no cervical adenopathy.   Neurological: She is alert and oriented to person, place, and time. No cranial nerve deficit.   Skin: Skin is warm and dry. She is not diaphoretic. No erythema.   Psychiatric: Her behavior is normal.   Vitals reviewed.       LABS/IMAGING: outside labs reviewed and summarized in HPI  Results for orders placed or performed in visit on 04/26/23   POC Glycosylated Hemoglobin (Hb A1C)    Specimen: Blood   Result Value Ref Range    Hemoglobin A1C 5.4 %    Lot Number [id number]     Expiration Date 12/12/24    POC Glucose, Blood    Specimen: Blood   Result Value Ref Range    Glucose 81 70 - 130 mg/dL     ASSESSMENT/PLAN:  1) Type 2 diabetes with associated complications of CAD, mild CKD and mild peripheral neuropathy: controlled, A1C% 5.4 today  - pt has not tolerated Jardiance or metformin ER even at lower doses  - Will continue Trulicity 0.75 mg weekly given it's cardiovascular benefit.   Instructed pt to check blood glucose pre-meal three times a week, varying the meal each check. Example: Mon: pre- BK and pre-lunch, Wed: pre-lunch and predinner, Fri: pre-dinner and bedtime    Outside health maintenance labs from 01/2023 reviewed     2) mixed hyperlipidemia: controlled  Lipids: (10/2022) Tchol 155, , HDL 72, LDL 61 on Crestor 20 mg and Zetia 10 mg     RTC 6 months    Signed: Mirtha Salinas MD        .

## 2023-10-23 DIAGNOSIS — E11.8 TYPE 2 DIABETES MELLITUS WITH COMPLICATION: ICD-10-CM

## 2023-10-23 RX ORDER — DULAGLUTIDE 0.75 MG/.5ML
0.75 INJECTION, SOLUTION SUBCUTANEOUS WEEKLY
Qty: 6 ML | Refills: 3 | Status: SHIPPED | OUTPATIENT
Start: 2023-10-23

## 2023-10-23 NOTE — TELEPHONE ENCOUNTER
Caller: Chayito Lua [facility]    Relationship: Self    Best call back number: [phone]    Requested Prescriptions:   Requested Prescriptions     Pending Prescriptions Disp Refills    Dulaglutide (Trulicity) 0.75 MG/0.5ML solution pen-injector 6 mL 3        Pharmacy where request should be sent:  [facility]    Last office visit with prescribing clinician: 4/26/2023   Last telemedicine visit with prescribing clinician: Visit date not found   Next office visit with prescribing clinician: 2/12/2024     Additional details provided by patient:     Does the patient have less than a 3 day supply:  [] Yes  [x] No    Would you like a call back once the refill request has been completed: [] Yes [x] No    If the office needs to give you a call back, can they leave a voicemail: [] Yes [x] No    Ely Galeana Rep   10/23/23 16:14 EDT

## 2023-12-04 ENCOUNTER — OFFICE VISIT (OUTPATIENT)
Dept: ENDOCRINOLOGY | Facility: CLINIC | Age: 73
End: 2023-12-04
Payer: MEDICARE

## 2023-12-04 VITALS
BODY MASS INDEX: 28.52 KG/M2 | WEIGHT: 161 LBS | OXYGEN SATURATION: 98 % | DIASTOLIC BLOOD PRESSURE: 60 MMHG | HEART RATE: 68 BPM | SYSTOLIC BLOOD PRESSURE: 122 MMHG

## 2023-12-04 DIAGNOSIS — E11.59 TYPE 2 DIABETES MELLITUS WITH CARDIAC COMPLICATION: Primary | ICD-10-CM

## 2023-12-04 DIAGNOSIS — E78.2 MIXED HYPERLIPIDEMIA: ICD-10-CM

## 2023-12-04 LAB
EXPIRATION DATE: NORMAL
EXPIRATION DATE: NORMAL
GLUCOSE BLDC GLUCOMTR-MCNC: 103 MG/DL (ref 70–130)
HBA1C MFR BLD: 5.4 % (ref 4.5–5.7)
Lab: NORMAL
Lab: NORMAL

## 2023-12-04 PROCEDURE — 83036 HEMOGLOBIN GLYCOSYLATED A1C: CPT | Performed by: INTERNAL MEDICINE

## 2023-12-04 PROCEDURE — 3078F DIAST BP <80 MM HG: CPT | Performed by: INTERNAL MEDICINE

## 2023-12-04 PROCEDURE — 3074F SYST BP LT 130 MM HG: CPT | Performed by: INTERNAL MEDICINE

## 2023-12-04 PROCEDURE — 99214 OFFICE O/P EST MOD 30 MIN: CPT | Performed by: INTERNAL MEDICINE

## 2023-12-04 PROCEDURE — 82947 ASSAY GLUCOSE BLOOD QUANT: CPT | Performed by: INTERNAL MEDICINE

## 2023-12-04 PROCEDURE — 3044F HG A1C LEVEL LT 7.0%: CPT | Performed by: INTERNAL MEDICINE

## 2023-12-04 RX ORDER — BLOOD-GLUCOSE METER
1 EACH MISCELLANEOUS TAKE AS DIRECTED
Qty: 1 KIT | Refills: 0 | Status: SHIPPED | OUTPATIENT
Start: 2023-12-04

## 2023-12-04 RX ORDER — CLOPIDOGREL BISULFATE 75 MG/1
75 TABLET ORAL DAILY
COMMUNITY
Start: 2023-09-20 | End: 2024-02-18

## 2023-12-04 NOTE — PROGRESS NOTES
Chief Complaint   Patient presents with    Type 2 diabetes mellitus with complication     HPI:   Chayito Lua is a 73 y.o.female who presents to [facility] for f/u evaluation of of her Type 2 DM. Last visit 04/26/2023. Her history is as follows:    Interim Events:   09/20/2023: PFO closure at [facility]. IS now on Plavix and will stop medication after 6 months  - Is tolerating Trulicity    1) Type 2 diabetes with associated complications of CAD, mild CKD, and mild peripheral neuropathy:  - diagnosed in approximately 2000  - did not tolerate the Jardiance: caused repeat candidiasis  - metformin ER 1000 mg or 500 mg daily caused diarrhea    Current DM Medications:  - Trulicity 0.75 mg weekly: tolerating    Glucometer Review: reports checking in AM and midday  Majority AM BGs < 150 per her report  - denies hypoglycemia    DM Health Maintenance:  Ophtho: Last visit - 2022, neg for retinopathy per patient  Podiatry: Last visit - N/A  Monofilament / Foot Exam: 10/2022  Lipids: (09/2023) Tchol 145, TG 83, HDL 62, LDL 67 on Crestor 20 mg and Zetia 10 mg   Urine Microalb/Cr ratio: (10/2022), normal at 10.5 on an ARB  Aspirin: 81 mg daily  (09/2023) CMP - Cr 0.93, eGFR 64.2, AST 36, ALT 18  (09/2023) CBC - Hgb 11.8  (10/2022) vit B 12 - 1,739    2) h/o abnormal TFT's:  Now resolved  - in 2012 pt was having various symptoms including fatigue and joint pain. She had been prescribed plaquenil and developed anemia due to the medication. To her knowledge, her TSH was never elevated at that time. However, she was prescribed liothyronine 5 mcg BID at that time by her cardiologist. At the time, she had significant fatigue that did improve. However, she overall felt better after stopping the plaquenil.  - I had her stop the cytomel in 12/2016 and she felt much better off the medication  - Labs checked since 2017 off the cytomel  Have shown normal thyroid function:    (01/27/2023) TSH 0.65 - normal    Other History:  - On 01/26/2023, pt  "s/p left femur fracture. Had surgery at [facility]. Pt developed transient neurologic symptoms (rt facial droop and difficulty speaking) post-operatively and was transferred to [facility]. Imaging showed a \"non-occlusive left M2 thrombus\" and a \"an evolving acute lacunar infarct in the left caudate head\".  \"TPA not given as she presented outside the treatment window. Mechanical thrombectomy not performed as there was no large vessel occlusion on imaging and symptoms had resolved. Mechanism id likely TIA versus small self-resolving stroke in the setting of hypercoagulability with recent hip surgery.\"  - pt started on ASA 81 mg. Pt was already on rosuvastatin 20 mg + zetia 10 mg   - pt wore a holter monitor in 03/2023 that showed no atrial fibrillation or other dysrhythmia  - Dr. Gallo at [facility] is managing her osteoporosis. Is off plaquenil currently   - 09/20/2023: PFO closure at . IS now on Plavix and will stop medication after 6 months    Review of Systems   Constitutional:  Positive for fatigue.        Weight stable   HENT: Negative.     Eyes: Negative.    Respiratory: Negative.     Cardiovascular:  Negative for chest pain and palpitations.   Gastrointestinal: Negative.  Negative for constipation, diarrhea and nausea.   Endocrine: Negative.    Genitourinary: Negative.    Musculoskeletal:  Positive for arthralgias and gait problem.   Skin: Negative.    Neurological:  Negative for numbness.   Hematological: Negative.    Psychiatric/Behavioral: Negative.     All other systems reviewed and are negative.      The following portions of the patient's history were reviewed and updated as appropriate: allergies, current medications, past family history, past medical history, past social history, past surgical history and problem list.      /60 (BP Location: Right arm, Patient Position: Sitting, Cuff Size: Adult)   Pulse 68   Wt 73 kg (161 lb)   SpO2 98%   BMI 28.52 kg/m²   Physical " Exam   Constitutional: She is oriented to person, place, and time. She appears well-developed. No distress.   HENT:   Head: Normocephalic.   Eyes: Pupils are equal, round, and reactive to light. Conjunctivae are normal.   Neck: No tracheal deviation present. No thyromegaly present.   No palpable thyroid nodules     Cardiovascular: Normal rate, regular rhythm and normal heart sounds.   No murmur heard.  Pulmonary/Chest: Effort normal and breath sounds normal. No respiratory distress.   Abdominal:   No scarring at injection sites     Musculoskeletal:      Comments: Using Rolling Walker   Lymphadenopathy:     She has no cervical adenopathy.   Neurological: She is alert and oriented to person, place, and time. No cranial nerve deficit.   Skin: Skin is warm and dry. She is not diaphoretic. No erythema.   Psychiatric: Her behavior is normal.   Vitals reviewed.       LABS/IMAGING: outside labs reviewed and summarized in HPI  Results for orders placed or performed in visit on 12/04/23   POC Glucose, Blood    Specimen: Blood   Result Value Ref Range    Glucose 103 70 - 130 mg/dL    Lot Number [id number]     Expiration Date 06/30/2024    POC Glycosylated Hemoglobin (Hb A1C)    Specimen: Blood   Result Value Ref Range    Hemoglobin A1C 5.4 4.5 - 5.7 %    Lot Number [id number]     Expiration Date 07/02/2025      ASSESSMENT/PLAN:  1) Type 2 diabetes with associated complications of CAD, mild CKD and mild peripheral neuropathy: controlled, A1C% 5.4 today  - pt has not tolerated Jardiance or metformin ER even at lower doses  - Will continue Trulicity 0.75 mg weekly given it's cardiovascular benefit.   - If she develops frequent BGs < 80, pt can D/C the Trulicity.  Instructed pt to check blood glucose pre-meal three times a week, varying the meal each check. Example: Mon: pre- BK and pre-lunch, Wed: pre-lunch and predinner, Fri: pre-dinner and bedtime    Outside health maintenance labs from 01/2023, 09/2023 reviewed     2) mixed  hyperlipidemia: controlled  Lipids: (09/2023) Tchol 145, TG 83, HDL 62, LDL 67 on Crestor 20 mg and Zetia 10 mg     RTC 6 months, tentatively. If pt's insurance not accepted by [facility] at that time, pt to cancel her appt.     Electronically Signed: Mirtha Salinas MD          .

## 2024-05-29 ENCOUNTER — TELEPHONE (OUTPATIENT)
Dept: ENDOCRINOLOGY | Facility: CLINIC | Age: 74
End: 2024-05-29
Payer: MEDICARE

## 2024-05-29 NOTE — TELEPHONE ENCOUNTER
Caller: Chayito Lua    Relationship to patient: Self    Best call back number: [phone]    Patient is needing: NEEDING RECORDS SENT TO DR. BLAKE OFFICE     [phone]

## 2024-05-29 NOTE — TELEPHONE ENCOUNTER
Called patient. No answer. Left detailed message.     If patient is wanting her entire medical record sent we will need to mail her a records release form to be filled out. If she has possibly changed her PCP to a Dr. Pierce and they are needing the last office note we can send.     If patient calls back please transfer to the office. She can speak with any of us.

## 2024-06-13 DIAGNOSIS — G89.29 CHRONIC BACK PAIN, UNSPECIFIED BACK LOCATION, UNSPECIFIED BACK PAIN LATERALITY: Primary | ICD-10-CM

## 2024-06-13 DIAGNOSIS — M54.9 CHRONIC BACK PAIN, UNSPECIFIED BACK LOCATION, UNSPECIFIED BACK PAIN LATERALITY: Primary | ICD-10-CM

## 2024-06-13 NOTE — TELEPHONE ENCOUNTER
Incoming Refill Request      Medication requested (name and dose): TRAMADOL 50MG    Pharmacy where request should be sent: [facility]    Additional details provided by patient: PATIENT IS OUT    Best call back number:     Does the patient have less than a 3 day supply:  [] Yes  [] No    Ely Almaguer Rep  06/13/24, 15:42 EDT

## 2024-06-14 RX ORDER — TRAMADOL HYDROCHLORIDE 50 MG/1
TABLET ORAL
Qty: 180 TABLET | Refills: 2 | Status: SHIPPED | OUTPATIENT
Start: 2024-06-14

## 2024-10-01 DIAGNOSIS — E11.8 TYPE 2 DIABETES MELLITUS WITH COMPLICATION: ICD-10-CM

## 2024-10-01 RX ORDER — DULAGLUTIDE 0.75 MG/.5ML
INJECTION, SOLUTION SUBCUTANEOUS
Qty: 6 ML | Refills: 3 | OUTPATIENT
Start: 2024-10-01

## 2024-10-06 PROBLEM — M17.0 DEGENERATIVE ARTHRITIS OF KNEE, BILATERAL: Status: ACTIVE | Noted: 2024-10-06

## 2024-10-23 ENCOUNTER — LAB (OUTPATIENT)
Facility: HOSPITAL | Age: 74
End: 2024-10-23
Payer: MEDICARE

## 2024-10-23 ENCOUNTER — OFFICE VISIT (OUTPATIENT)
Age: 74
End: 2024-10-23
Payer: MEDICARE

## 2024-10-23 VITALS
BODY MASS INDEX: 29.95 KG/M2 | HEART RATE: 64 BPM | HEIGHT: 63 IN | TEMPERATURE: 97.1 F | WEIGHT: 169 LBS | DIASTOLIC BLOOD PRESSURE: 60 MMHG | SYSTOLIC BLOOD PRESSURE: 102 MMHG

## 2024-10-23 DIAGNOSIS — Z79.899 HIGH RISK MEDICATION USE: ICD-10-CM

## 2024-10-23 DIAGNOSIS — F11.90 OPIOID USE: ICD-10-CM

## 2024-10-23 DIAGNOSIS — M15.4 EROSIVE OSTEOARTHRITIS OF BOTH HANDS: ICD-10-CM

## 2024-10-23 DIAGNOSIS — M15.9 GENERALIZED OSTEOARTHROSIS, INVOLVING MULTIPLE SITES: ICD-10-CM

## 2024-10-23 DIAGNOSIS — M15.4 EROSIVE OSTEOARTHRITIS OF BOTH HANDS: Primary | ICD-10-CM

## 2024-10-23 DIAGNOSIS — M54.9 CHRONIC BACK PAIN, UNSPECIFIED BACK LOCATION, UNSPECIFIED BACK PAIN LATERALITY: ICD-10-CM

## 2024-10-23 DIAGNOSIS — G89.29 CHRONIC BACK PAIN, UNSPECIFIED BACK LOCATION, UNSPECIFIED BACK PAIN LATERALITY: ICD-10-CM

## 2024-10-23 DIAGNOSIS — M85.89 OSTEOPENIA OF MULTIPLE SITES: ICD-10-CM

## 2024-10-23 DIAGNOSIS — Z95.1 HX OF CABG: ICD-10-CM

## 2024-10-23 DIAGNOSIS — G89.29 ENCOUNTER FOR CHRONIC PAIN MANAGEMENT: ICD-10-CM

## 2024-10-23 LAB
AMPHET+METHAMPHET UR QL: NEGATIVE
AMPHETAMINES UR QL: NEGATIVE
BARBITURATES UR QL SCN: NEGATIVE
BASOPHILS # BLD AUTO: 0.02 10*3/MM3 (ref 0–0.2)
BASOPHILS NFR BLD AUTO: 0.4 % (ref 0–1.5)
BENZODIAZ UR QL SCN: NEGATIVE
BUPRENORPHINE SERPL-MCNC: NEGATIVE NG/ML
CANNABINOIDS SERPL QL: NEGATIVE
COCAINE UR QL: NEGATIVE
DEPRECATED RDW RBC AUTO: 43.8 FL (ref 37–54)
EOSINOPHIL # BLD AUTO: 0.12 10*3/MM3 (ref 0–0.4)
EOSINOPHIL NFR BLD AUTO: 2.6 % (ref 0.3–6.2)
ERYTHROCYTE [DISTWIDTH] IN BLOOD BY AUTOMATED COUNT: 12.7 % (ref 12.3–15.4)
FENTANYL UR-MCNC: NEGATIVE NG/ML
HCT VFR BLD AUTO: 34.6 % (ref 34–46.6)
HGB BLD-MCNC: 11.2 G/DL (ref 12–15.9)
IMM GRANULOCYTES # BLD AUTO: 0.01 10*3/MM3 (ref 0–0.05)
IMM GRANULOCYTES NFR BLD AUTO: 0.2 % (ref 0–0.5)
LYMPHOCYTES # BLD AUTO: 1.28 10*3/MM3 (ref 0.7–3.1)
LYMPHOCYTES NFR BLD AUTO: 27.3 % (ref 19.6–45.3)
MCH RBC QN AUTO: 30.3 PG (ref 26.6–33)
MCHC RBC AUTO-ENTMCNC: 32.4 G/DL (ref 31.5–35.7)
MCV RBC AUTO: 93.5 FL (ref 79–97)
METHADONE UR QL SCN: NEGATIVE
MONOCYTES # BLD AUTO: 0.49 10*3/MM3 (ref 0.1–0.9)
MONOCYTES NFR BLD AUTO: 10.4 % (ref 5–12)
NEUTROPHILS NFR BLD AUTO: 2.77 10*3/MM3 (ref 1.7–7)
NEUTROPHILS NFR BLD AUTO: 59.1 % (ref 42.7–76)
NRBC BLD AUTO-RTO: 0 /100 WBC (ref 0–0.2)
OPIATES UR QL: NEGATIVE
OXYCODONE UR QL SCN: NEGATIVE
PCP UR QL SCN: NEGATIVE
PLATELET # BLD AUTO: 153 10*3/MM3 (ref 140–450)
PMV BLD AUTO: 10.2 FL (ref 6–12)
RBC # BLD AUTO: 3.7 10*6/MM3 (ref 3.77–5.28)
TRICYCLICS UR QL SCN: NEGATIVE
WBC NRBC COR # BLD AUTO: 4.69 10*3/MM3 (ref 3.4–10.8)

## 2024-10-23 PROCEDURE — 85652 RBC SED RATE AUTOMATED: CPT

## 2024-10-23 PROCEDURE — 86200 CCP ANTIBODY: CPT

## 2024-10-23 PROCEDURE — 36415 COLL VENOUS BLD VENIPUNCTURE: CPT

## 2024-10-23 PROCEDURE — 80307 DRUG TEST PRSMV CHEM ANLYZR: CPT

## 2024-10-23 PROCEDURE — 85025 COMPLETE CBC W/AUTO DIFF WBC: CPT

## 2024-10-23 PROCEDURE — 86431 RHEUMATOID FACTOR QUANT: CPT

## 2024-10-23 PROCEDURE — 80053 COMPREHEN METABOLIC PANEL: CPT

## 2024-10-23 RX ORDER — ALENDRONATE SODIUM 70 MG/1
70 TABLET ORAL
Qty: 12 TABLET | Refills: 3 | Status: SHIPPED | OUTPATIENT
Start: 2024-10-23

## 2024-10-23 RX ORDER — TRAMADOL HYDROCHLORIDE 50 MG/1
TABLET ORAL
Qty: 180 TABLET | Refills: 2 | Status: SHIPPED | OUTPATIENT
Start: 2024-10-23

## 2024-10-23 NOTE — PROGRESS NOTES
Office Follow Up      Date: 10/23/2024   Patient Name: Chayito Lua  MRN: [id number]  YOB: 1950    Referring Physician: No ref. provider found     Chief Complaint:   Chief Complaint   Patient presents with    Follow-up    Arthritis    Osteoarthritis       History of Present Illness: Chayito Lua is a 74 y.o. female who is here today for follow up on widespread osteoarthritis particularly affecting the PIP and DIP joints of both hands.    Erosive OA hands on x-ray    Tramadol are very helpful for her arthritis hand pain.  Hand pain much less.  No side effects.    She functions better on the tramadol.  She avoids all NSAIDs with renal insufficiency.  She stopped Plaquenil due to intolerance    She has been told she needs a right shoulder replacement but has declined surgery.  She had a left shoulder injury that she did some PT for and got a steroid injection that was helpful.    She had left knee replacement with Dr. Peck September 2019.    X-ray showed extensive degenerative disc disease cervical spine.      Subjective       Review of Systems: Review of Systems   Constitutional:  Positive for chills and fatigue. Negative for fever and unexpected weight loss.   HENT:  Negative for mouth sores, sinus pressure and sore throat.    Eyes:  Negative for pain and redness.   Respiratory:  Positive for shortness of breath. Negative for cough.    Cardiovascular:  Negative for chest pain.   Gastrointestinal:  Negative for abdominal pain, blood in stool, diarrhea, nausea, vomiting and GERD.   Endocrine: Negative for polydipsia and polyuria.   Genitourinary:  Negative for dysuria, genital sores and hematuria.   Musculoskeletal:  Positive for arthralgias, joint swelling and neck pain. Negative for back pain, myalgias and neck stiffness.   Skin:  Negative for rash and bruise.   Allergic/Immunologic: Negative for immunocompromised state.   Neurological:  Positive for numbness. Negative for  seizures, weakness and memory problem.   Hematological:  Negative for adenopathy. Does not bruise/bleed easily.   Psychiatric/Behavioral:  Negative for depressed mood. The patient is not nervous/anxious.         Medications:   Current Outpatient Medications:     Accu-Chek Elisabeth Plus test strip, 1 each by Other route 2 (two) times a day. TEST 3 TIMES DAILY, Disp: 200 each, Rfl: 3    Accu-Chek Softclix Lancets lancets, 1 each by Other route 2 (two) times a day. Use as instructed, Disp: 200 each, Rfl: 3    alendronate (FOSAMAX) 70 MG tablet, Take 1 tablet by mouth Every 7 (Seven) Days., Disp: 12 tablet, Rfl: 3    aspirin 81 MG tablet, Take 1 tablet by mouth Daily. Resume in 1 month, Disp: , Rfl:     Blood Glucose Monitoring Suppl (Accu-Chek Elisabeth Plus) w/Device kit, Use 1 Device Take As Directed., Disp: 1 kit, Rfl: 0    Calcium-Magnesium-Vitamin D (CALCIUM MAGNESIUM PO), Take  by mouth. calcium-magnesium 300 mg-300 mg tablet, Disp: , Rfl:     carvedilol (COREG) 6.25 MG tablet, Take 1 tablet by mouth 2 (Two) Times a Day. With morning and evening meal, Disp: , Rfl:     Cholecalciferol (VITAMIN D3) 5000 units capsule capsule, Take 1 capsule by mouth Daily., Disp: , Rfl:     cycloSPORINE (RESTASIS OP), Apply  to eye(s) as directed by provider., Disp: , Rfl:     Dulaglutide (Trulicity) 0.75 MG/0.5ML solution pen-injector, Inject 0.75 mg under the skin into the appropriate area as directed 1 (One) Time Per Week., Disp: 6 mL, Rfl: 3    ezetimibe (ZETIA) 10 MG tablet, Take 1 tablet by mouth Daily., Disp: , Rfl:     losartan (COZAAR) 50 MG tablet, Take 1 tablet by mouth Daily., Disp: , Rfl:     RANEXA 500 MG 12 hr tablet, take 1 tablet by mouth twice a day, Disp: , Rfl: 0    rosuvastatin (CRESTOR) 20 MG tablet, Take 1 tablet by mouth Daily., Disp: , Rfl:     traMADol (ULTRAM) 50 MG tablet, Take 1-2 tablets every 6 hours as needed for pain, Disp: 180 tablet, Rfl: 2    Allergies:   Allergies   Allergen Reactions    Ketoprofen  "Provider Review Needed    Hydroxychloroquine Other (See Comments)     Other reaction(s): Anemia       I have reviewed and updated the patient's chief complaint, history of present illness, review of systems, past medical history, surgical history, family history, social history, medications and allergy list as appropriate.     Objective        Vital Signs:   Vitals:    10/23/24 1030   BP: 102/60   BP Location: Left arm   Pulse: 64   Temp: 97.1 °F (36.2 °C)   Weight: 76.7 kg (169 lb)   Height: 160 cm (63\")   PainSc:   3     Body mass index is 29.94 kg/m².      Physical Exam:  Physical Exam   MUSCULOSKELETAL:   No peripheral synovitis  Heberden and Manav's nodes present throughout the hands with angular deformities.    well healed scar from carpel tunnel release right wrist  Well-healed scar left knee from joint replacement.  Crepitus right knee.    Complete joint exam was performed including the MCPs, PIPs, DIPs of the hands, wrists, elbows, shoulders, hips, knees and ankles.  No soft tissue swelling or tenderness is present except as above.    General: The patient is well-developed and well nourished. Cooperative, alert and oriented. Affect is normal. Hydration appears normal.   HEENT: Normocephalic and atraumatic. No notable alopecia. Lids and conjunctiva are normal. Pupils are equal and sclera are clear. Oropharynx is clear   NECK neck is supple without adenopathy, masses or thyromegaly.   CARDIOVASCULAR: Regular rate and rhythm. No murmurs, rubs or gallops   LUNGS: Effort is normal. Lungs are clear bilateral   ABDOMEN: Not examined  EXTREMITIES: Peripheral pulses are intact. No clubbing.   SKIN: No rashes. No subcutaneous nodules. No digital ulcers. No sclerodactyly.   NEUROLOGIC: Gait is normal. Strength testing is normal.  No focal neurologic deficits    Results Review:   Labs:   Lab Results   Component Value Date    GLUCOSE 121 (H) 10/26/2022    BUN 19 10/26/2022    CREATININE 1.04 (H) 10/26/2022    EGFR " "56.5 (L) 10/26/2022    BCR 18.3 10/26/2022    K 4.0 10/26/2022    CO2 29.0 10/26/2022    CALCIUM 9.3 10/26/2022    PROTENTOTREF 6.7 06/11/2020    ALBUMIN 3.80 10/26/2022    BILITOT 0.5 10/26/2022    AST 49 (H) 10/26/2022    ALT 56 (H) 10/26/2022     Lab Results   Component Value Date    WBC 5.66 09/21/2023    HGB 11.8 09/21/2023    HCT 35.1 09/21/2023    MCV 90 09/21/2023     09/21/2023     Lab Results   Component Value Date    SEDRATE 28 09/17/2019     Lab Results   Component Value Date    CRP 0.11 09/17/2019     No results found for: \"QUANTIFERO\", \"QUANTITB1\", \"QUANTITB2\", \"QUANTIFERN\", \"QUANTIFERM\", \"QUANTITBGLDP\"  No results found for: \"RF\"  No results found for: \"HEPBSAG\", \"HEPAIGM\", \"HEPBIGMCORE\", \"HEPCVIRUSABY\"      Procedures    Assessment / Plan      Assessment & Plan  Erosive osteoarthritis of both hands  Has [employer] season tickets since the 70s.   follows in clinic  sero neg; erosive OA dips;   no help mtx  Avoids NSAIDs with renal insufficiency  Prior Plaquenil [id number], restart 12/15/23-3/24 (intolerant)  **Current: tramadol.    Erosive OA hands on x-ray  Avoids NSAIDs with CKD    She stopped Plaquenil due to intolerance  Continue tramadol which she finds very helpful for her arthritis  Labs ordered today for monitoring as below.  Update hand x-rays  Medication refilled  Return to clinic 6 months  Generalized osteoarthrosis, involving multiple sites  chronic right shoulder pain with history of torn rotator cuff/OA; s/p injection 1.17; mri 2017  Current orthopedist for shoulder Dr. Huff  Previous Dr. Cisco Cristobal/ Dr stoner.  Dr. Peck ortho; left knee replacement September 2019    Avoid NSAIDs with CKD  Chronic back pain, unspecified back location, unspecified back pain laterality    High risk medication use  Tramadol    Urine drug screen intermittent for monitoring  Controlled medication agreement reviewed and signed 10/23/2024  Saman reviewed and appropriate.  Well tolerated and " effective for her chronic pain   Risks and benefits of opiate therapy discussed in detail with the patient.  These included but are not limited to potential problems with physical dependence/withdraw, addiction potential, tolerance, impaired decision making, balance and thinking problems that make it unsafe to drive a vehicle or operate dangerous machinery under the influence of opiates.  Patient has signed opiate contract detailing the accountability and legitimate medical use of controlled substances.  Recommend using the lowest effective dose only as needed to relieve pain.   Encounter for chronic pain management    Opioid use    Osteopenia of multiple sites  DEXA 3/23 outside hospital shows osteopenia  Current:  Fosamax 12/15/23 start.    Recommend calcium vitamin-D and weight-bearing exercise  Recommend Fosamax once weekly   Risk benefits of bisphosphonates/prolia discussed in detail including but not limited to osteonecrosis jaw, atypical femoral fracture, bone death, kidney failure, hypocalcemia.  Patient has no reported jaw or tooth pain.  Hx of CABG  cabg in 2007; cardiology dr schwab.    Orders Placed This Encounter   Procedures    XR Hand 2 View Bilateral    CBC Auto Differential    Comprehensive Metabolic Panel    Urine Drug Screen - Urine, Clean Catch    Sedimentation Rate    Rheumatoid Factor    Cyclic Citrul Peptide Antibody, IgG / IgA     New Medications Ordered This Visit   Medications    alendronate (FOSAMAX) 70 MG tablet     Sig: Take 1 tablet by mouth Every 7 (Seven) Days.     Dispense:  12 tablet     Refill:  3    traMADol (ULTRAM) 50 MG tablet     Sig: Take 1-2 tablets every 6 hours as needed for pain     Dispense:  180 tablet     Refill:  2           Follow Up:   Return in about 6 months (around 4/23/2025).        Sudheer Gallo MD  [facility] [place]

## 2024-10-23 NOTE — PATIENT INSTRUCTIONS
"Osteoarthritis    Osteoarthritis is a type of arthritis. It refers to joint pain or joint disease. Osteoarthritis affects tissue that covers the ends of bones in joints (cartilage). Cartilage acts as a cushion between the bones and helps them move smoothly. Osteoarthritis occurs when cartilage in the joints gets worn down. Osteoarthritis is sometimes called \"wear and tear\" arthritis.  Osteoarthritis is the most common form of arthritis. It often occurs in older people. It is a condition that gets worse over time. The joints most often affected by this condition are in the fingers, toes, hips, knees, and spine, including the neck and lower back.    What are the causes?  This condition is caused by the wearing down of cartilage that covers the ends of bones.    What increases the risk?  The following factors may make you more likely to develop this condition:  Being age 50 or older.  Obesity.  Overuse of joints.  Past injury of a joint.  Past surgery on a joint.  Family history of osteoarthritis.    What are the signs or symptoms?  The main symptoms of this condition are pain, swelling, and stiffness in the joint. Other symptoms may include:  An enlarged joint.  More pain and further damage caused by small pieces of bone or cartilage that break off and float inside of the joint.  Small deposits of bone (osteophytes) that grow on the edges of the joint.  A grating or scraping feeling inside the joint when you move it.  Popping or creaking sounds when you move.  Difficulty walking or exercising.  An inability to  items, twist your hand, or control the movements of your hands and fingers.    How is this diagnosed?  This condition may be diagnosed based on:  Your medical history.  A physical exam.  Your symptoms.  X-rays of the affected joints.  Blood tests to rule out other types of arthritis.    How is this treated?  There is no cure for this condition, but treatment can help control pain and improve joint function. " Treatment may include a combination of therapies, such as:  Pain relief techniques, such as:  Applying heat and cold to the joint.  Massage.  A form of talk therapy called cognitive behavioral therapy (CBT). This therapy helps you set goals and follow up on the changes that you make.  Medicines for pain and inflammation. The medicines can be taken by mouth or applied to the skin. They include:  NSAIDs, such as ibuprofen.  Prescription medicines.  Strong anti-inflammatory medicines (corticosteroids).  Certain nutritional supplements.  A prescribed exercise program. You may work with a physical therapist.  Assistive devices, such as a brace, wrap, splint, specialized glove, or cane.  A weight control plan.  Surgery, such as:  An osteotomy. This is done to reposition the bones and relieve pain or to remove loose pieces of bone and cartilage.  Joint replacement surgery. You may need this surgery if you have advanced osteoarthritis.    Follow these instructions at home:    Activity  Rest your affected joints as told by your health care provider.  Exercise as told by your provider. The provider may recommend specific types of exercise, such as:  Strengthening exercises. These are done to strengthen the muscles that support joints affected by arthritis.  Aerobic activities. These are exercises, such as brisk walking or water aerobics, that increase your heart rate.  Range-of-motion activities. These help your joints move more easily.  Balance and agility exercises.    Managing pain, stiffness, and swelling         If told, apply heat to the affected area as often as told by your provider. Use the heat source that your provider recommends, such as a moist heat pack or a heating pad.  If you have a removable assistive device, remove it as told by your provider.  Place a towel between your skin and the heat source. If your provider tells you to keep the assistive device on while you apply heat, place a towel between the  assistive device and the heat source.  Leave the heat on for 20-30 minutes.  If told, put ice on the affected area.  If you have a removable assistive device, remove it as told by your provider.  Put ice in a plastic bag.  Place a towel between your skin and the bag. If your provider tells you to keep the assistive device on during icing, place a towel between the assistive device and the bag.  Leave the ice on for 20 minutes, 2-3 times a day.  If your skin turns bright red, remove the ice or heat right away to prevent skin damage. The risk of damage is higher if you cannot feel pain, heat, or cold.  Move your fingers or toes often to reduce stiffness and swelling.  Raise (elevate) the affected area above the level of your heart while you are sitting or lying down.    General instructions  Take over-the-counter and prescription medicines only as told by your provider.  Maintain a healthy weight. Follow instructions from your provider for weight control.  Do not use any products that contain nicotine or tobacco. These products include cigarettes, chewing tobacco, and vaping devices, such as e-cigarettes. If you need help quitting, ask your provider.  Use assistive devices as told by your provider.    Where to find more information  National [place] of Arthritis and Musculoskeletal and Skin Diseases: niams.nih.gov  National [place] on Aging: kobi.nih.gov  American College of Rheumatology: rheumatology.org    Contact a health care provider if:  You have redness, swelling, or a feeling of warmth in a joint that gets worse.  You have a fever along with joint or muscle aches.  You develop a rash.  You have trouble doing your normal activities.  You have pain that gets worse and is not relieved by pain medicine.    This information is not intended to replace advice given to you by your health care provider. Make sure you discuss any questions you have with your health care provider.  Document Revised: 08/17/2023  Document Reviewed: 08/17/2023  Elsevier Patient Education © 2024 [employer]

## 2024-10-23 NOTE — ASSESSMENT & PLAN NOTE
Has [employer] season tickets since the 70s.   follows in clinic  sero neg; erosive OA dips;   no help mtx  Avoids NSAIDs with renal insufficiency  Prior Plaquenil [id number], restart 12/15/23-3/24 (intolerant)  **Current: tramadol.    Erosive OA hands on x-ray  Avoids NSAIDs with CKD    She stopped Plaquenil due to intolerance  Continue tramadol which she finds very helpful for her arthritis  Labs ordered today for monitoring as below.  Update hand x-rays  Medication refilled  Return to clinic 6 months

## 2024-10-23 NOTE — ASSESSMENT & PLAN NOTE
DEXA 3/23 outside hospital shows osteopenia  Current:  Fosamax 12/15/23 start.    Recommend calcium vitamin-D and weight-bearing exercise  Recommend Fosamax once weekly   Risk benefits of bisphosphonates/prolia discussed in detail including but not limited to osteonecrosis jaw, atypical femoral fracture, bone death, kidney failure, hypocalcemia.  Patient has no reported jaw or tooth pain.

## 2024-10-23 NOTE — ASSESSMENT & PLAN NOTE
chronic right shoulder pain with history of torn rotator cuff/OA; s/p injection 1.17; mri 2017  Current orthopedist for shoulder Dr. Huff  Previous Dr. Cisco Cristobal/ Dr stoner.  Dr. Peck ortho; left knee replacement September 2019    Avoid NSAIDs with CKD

## 2024-10-23 NOTE — ASSESSMENT & PLAN NOTE
Tramadol    Urine drug screen intermittent for monitoring  Controlled medication agreement reviewed and signed 10/23/2024  Saman reviewed and appropriate.  Well tolerated and effective for her chronic pain   Risks and benefits of opiate therapy discussed in detail with the patient.  These included but are not limited to potential problems with physical dependence/withdraw, addiction potential, tolerance, impaired decision making, balance and thinking problems that make it unsafe to drive a vehicle or operate dangerous machinery under the influence of opiates.  Patient has signed opiate contract detailing the accountability and legitimate medical use of controlled substances.  Recommend using the lowest effective dose only as needed to relieve pain.

## 2024-10-24 LAB
ALBUMIN SERPL-MCNC: 3.4 G/DL (ref 3.5–5.2)
ALBUMIN/GLOB SERPL: 1 G/DL
ALP SERPL-CCNC: 68 U/L (ref 39–117)
ALT SERPL W P-5'-P-CCNC: 20 U/L (ref 1–33)
ANION GAP SERPL CALCULATED.3IONS-SCNC: 13.3 MMOL/L (ref 5–15)
AST SERPL-CCNC: 32 U/L (ref 1–32)
BILIRUB SERPL-MCNC: 0.3 MG/DL (ref 0–1.2)
BUN SERPL-MCNC: 19 MG/DL (ref 8–23)
BUN/CREAT SERPL: 16.8 (ref 7–25)
CALCIUM SPEC-SCNC: 8.6 MG/DL (ref 8.6–10.5)
CHLORIDE SERPL-SCNC: 103 MMOL/L (ref 98–107)
CHROMATIN AB SERPL-ACNC: 11.1 IU/ML (ref 0–14)
CO2 SERPL-SCNC: 22.7 MMOL/L (ref 22–29)
CREAT SERPL-MCNC: 1.13 MG/DL (ref 0.57–1)
EGFRCR SERPLBLD CKD-EPI 2021: 50.5 ML/MIN/1.73
ERYTHROCYTE [SEDIMENTATION RATE] IN BLOOD: 19 MM/HR (ref 0–30)
GLOBULIN UR ELPH-MCNC: 3.5 GM/DL
GLUCOSE SERPL-MCNC: 109 MG/DL (ref 65–99)
POTASSIUM SERPL-SCNC: 4.1 MMOL/L (ref 3.5–5.2)
PROT SERPL-MCNC: 6.9 G/DL (ref 6–8.5)
SODIUM SERPL-SCNC: 139 MMOL/L (ref 136–145)

## 2024-10-25 LAB — CCP IGA+IGG SERPL IA-ACNC: 18 UNITS (ref 0–19)

## 2024-11-04 ENCOUNTER — TELEPHONE (OUTPATIENT)
Age: 74
End: 2024-11-04
Payer: MEDICARE

## 2025-03-13 DIAGNOSIS — M54.9 CHRONIC BACK PAIN, UNSPECIFIED BACK LOCATION, UNSPECIFIED BACK PAIN LATERALITY: ICD-10-CM

## 2025-03-13 DIAGNOSIS — G89.29 CHRONIC BACK PAIN, UNSPECIFIED BACK LOCATION, UNSPECIFIED BACK PAIN LATERALITY: ICD-10-CM

## 2025-03-13 RX ORDER — TRAMADOL HYDROCHLORIDE 50 MG/1
TABLET ORAL
Qty: 180 TABLET | Refills: 2 | Status: SHIPPED | OUTPATIENT
Start: 2025-03-13

## 2025-03-13 NOTE — TELEPHONE ENCOUNTER
Caller: Chayito Lua [facility]    Relationship: Self    Best call back number: [phone]     Requested Prescriptions:   Requested Prescriptions     Pending Prescriptions Disp Refills    traMADol (ULTRAM) 50 MG tablet 180 tablet 2     Sig: Take 1-2 tablets every 6 hours as needed for pain        Pharmacy where request should be sent:  [facility]    Last office visit with prescribing clinician: 10/23/2024   Last telemedicine visit with prescribing clinician: Visit date not found   Next office visit with prescribing clinician: 4/30/2025     Additional details provided by patient:     Does the patient have less than a 3 day supply:  [x] Yes  [] No    Would you like a call back once the refill request has been completed: [] Yes [x] No    If the office needs to give you a call back, can they leave a voicemail: [] Yes [x] No    Ely Salazar Rep   03/13/25 12:41 EDT

## 2025-04-30 ENCOUNTER — OFFICE VISIT (OUTPATIENT)
Age: 75
End: 2025-04-30
Payer: MEDICARE

## 2025-04-30 VITALS
BODY MASS INDEX: 29.75 KG/M2 | DIASTOLIC BLOOD PRESSURE: 58 MMHG | WEIGHT: 167.9 LBS | SYSTOLIC BLOOD PRESSURE: 116 MMHG | HEART RATE: 76 BPM | HEIGHT: 63 IN | TEMPERATURE: 97.3 F

## 2025-04-30 DIAGNOSIS — M54.9 CHRONIC BACK PAIN, UNSPECIFIED BACK LOCATION, UNSPECIFIED BACK PAIN LATERALITY: ICD-10-CM

## 2025-04-30 DIAGNOSIS — Z79.899 HIGH RISK MEDICATION USE: ICD-10-CM

## 2025-04-30 DIAGNOSIS — G89.29 CHRONIC BACK PAIN, UNSPECIFIED BACK LOCATION, UNSPECIFIED BACK PAIN LATERALITY: ICD-10-CM

## 2025-04-30 DIAGNOSIS — M15.4 EROSIVE OSTEOARTHRITIS OF BOTH HANDS: Primary | ICD-10-CM

## 2025-04-30 DIAGNOSIS — M15.9 GENERALIZED OSTEOARTHROSIS, INVOLVING MULTIPLE SITES: ICD-10-CM

## 2025-04-30 RX ORDER — TRAMADOL HYDROCHLORIDE 50 MG/1
TABLET ORAL
Qty: 180 TABLET | Refills: 2 | Status: CANCELLED | OUTPATIENT
Start: 2025-04-30

## 2025-04-30 RX ORDER — TRAMADOL HYDROCHLORIDE 50 MG/1
50-100 TABLET ORAL 2 TIMES DAILY PRN
Qty: 120 TABLET | Refills: 2 | Status: SHIPPED | OUTPATIENT
Start: 2025-04-30

## 2025-04-30 RX ORDER — CYCLOSPORINE 0.5 MG/ML
1 EMULSION OPHTHALMIC EVERY 12 HOURS
COMMUNITY
Start: 2025-03-05

## 2025-04-30 RX ORDER — TRAMADOL HYDROCHLORIDE 100 MG/1
1 CAPSULE ORAL EVERY MORNING
Qty: 30 CAPSULE | Refills: 2 | Status: SHIPPED | OUTPATIENT
Start: 2025-04-30

## 2025-04-30 RX ORDER — ROSUVASTATIN CALCIUM 40 MG/1
40 TABLET, COATED ORAL DAILY
COMMUNITY
Start: 2025-04-01

## 2025-04-30 NOTE — PROGRESS NOTES
Office Follow Up      Date: 04/30/2025   Patient Name: Chayito Lua  MRN: [id number]  YOB: 1950    Referring Physician: Sergio Pierce MD     Chief Complaint:   Chief Complaint   Patient presents with    Erosive Osteoarthritis       History of Present Illness: Chayito Lua is a 75 y.o. female who is here today for follow up on widespread osteoarthritis particularly affecting the PIP and DIP joints of both hands.    Erosive OA hands on x-ray 10/23/24     Tramadol are very helpful for her arthritis hand pain.  Hand pain much less.  No side effects.    She functions better on the tramadol.  She avoids all NSAIDs with renal insufficiency.  She stopped Plaquenil due to intolerance     She has been told she needs a right shoulder replacement but has declined surgery.  She had a left shoulder injury that she did some PT for and got a steroid injection that was helpful.     She had left knee replacement with Dr. Peck September 2019.    X-ray showed extensive degenerative disc disease cervical spine.       History of Present Illness        Subjective       Review of Systems: Review of Systems   Constitutional:  Positive for chills and fatigue. Negative for fever and unexpected weight loss.   HENT:  Positive for hearing loss. Negative for mouth sores, sinus pressure and sore throat.    Eyes:  Negative for pain and redness.   Respiratory:  Negative for cough and shortness of breath.    Cardiovascular:  Negative for chest pain.   Gastrointestinal:  Positive for constipation. Negative for abdominal pain, blood in stool, diarrhea, nausea, vomiting and GERD.   Endocrine: Positive for cold intolerance. Negative for polydipsia and polyuria.   Genitourinary:  Positive for urgency. Negative for dysuria, genital sores and hematuria.   Musculoskeletal:  Positive for arthralgias, back pain, joint swelling, myalgias, neck pain and neck stiffness.   Skin:  Positive for bruise. Negative for rash.    Allergic/Immunologic: Positive for environmental allergies.   Neurological:  Positive for memory problem. Negative for seizures, weakness and numbness.   Hematological:  Negative for adenopathy. Does not bruise/bleed easily.   Psychiatric/Behavioral:  Negative for depressed mood. The patient is not nervous/anxious.         Past Medical History:   Past Medical History:   Diagnosis Date    Anemia     Arthritis     CAD (coronary artery disease)     Carpal tunnel syndrome     Degenerative arthritis of hand     Diabetes mellitus     DX 2002, CHECK BS ONCE DAILY, LAST A1C 6.8%    Esophageal reflux     Hearing loss     NO AIDS    Heart disease     High cholesterol     Hypertension     Osteoarthritis of both knees     Osteoporosis     Ovarian cyst     Type 2 diabetes mellitus     Wears glasses        Past Surgical History:   Past Surgical History:   Procedure Laterality Date    CHOLECYSTECTOMY      COLONOSCOPY      2012    CORONARY ARTERY BYPASS GRAFT      HX OVARIAN CYSTECTOMY      TONSILLECTOMY      TOTAL KNEE ARTHROPLASTY Left 9/30/2019    Procedure: TOTAL KNEE ARTHROPLASTY LEFT;  Surgeon: Landon Peck MD;  Location: [facility];  Service: Orthopedics       Family History:   Family History   Problem Relation Age of Onset    Arthritis Mother     Diabetes Mother     Hypertension Mother     Heart attack Mother     Tuberculosis Mother     Hyperlipidemia Mother     Arthritis Father     Diabetes Father     Hypertension Father     Heart attack Father     Tuberculosis Father     Hyperlipidemia Father     Arthritis Other     Diabetes Other     Hypertension Other     Heart attack Other     Tuberculosis Other     Hyperlipidemia Other        Social History:   Social History     Socioeconomic History    Marital status:    Tobacco Use    Smoking status: Never    Smokeless tobacco: Never   Vaping Use    Vaping status: Never Used   Substance and Sexual Activity    Alcohol use: No    Drug use: No    Sexual activity: Defer        Medications:   Current Outpatient Medications:     Accu-Chek Elisabeth Plus test strip, 1 each by Other route 2 (two) times a day. TEST 3 TIMES DAILY, Disp: 200 each, Rfl: 3    Accu-Chek Softclix Lancets lancets, 1 each by Other route 2 (two) times a day. Use as instructed, Disp: 200 each, Rfl: 3    alendronate (FOSAMAX) 70 MG tablet, Take 1 tablet by mouth Every 7 (Seven) Days., Disp: 12 tablet, Rfl: 3    aspirin 81 MG tablet, Take 1 tablet by mouth Daily. Resume in 1 month, Disp: , Rfl:     Blood Glucose Monitoring Suppl (Accu-Chek Elisabeth Plus) w/Device kit, Use 1 Device Take As Directed., Disp: 1 kit, Rfl: 0    carvedilol (COREG) 6.25 MG tablet, Take 1 tablet by mouth 2 (Two) Times a Day. With morning and evening meal, Disp: , Rfl:     Cholecalciferol (VITAMIN D3) 5000 units capsule capsule, Take 1 capsule by mouth Daily., Disp: , Rfl:     cycloSPORINE (RESTASIS) 0.05 % ophthalmic emulsion, Administer 1 drop to both eyes Every 12 (Twelve) Hours., Disp: , Rfl:     Dulaglutide (Trulicity) 0.75 MG/0.5ML solution pen-injector, Inject 0.75 mg under the skin into the appropriate area as directed 1 (One) Time Per Week., Disp: 6 mL, Rfl: 3    ezetimibe (ZETIA) 10 MG tablet, Take 1 tablet by mouth Daily., Disp: , Rfl:     losartan (COZAAR) 50 MG tablet, Take 1 tablet by mouth Daily., Disp: , Rfl:     RANEXA 500 MG 12 hr tablet, take 1 tablet by mouth twice a day, Disp: , Rfl: 0    rosuvastatin (CRESTOR) 40 MG tablet, Take 1 tablet by mouth Daily., Disp: , Rfl:     traMADol (ULTRAM) 50 MG tablet, Take 1-2 tablets by mouth 2 (Two) Times a Day As Needed for Moderate Pain., Disp: 120 tablet, Rfl: 2    traMADol HCl, ER Biphasic, 100 MG capsule sustained-release 24 hr, Take 1 capsule by mouth Every Morning. For pain, Disp: 30 capsule, Rfl: 2    Allergies:   Allergies   Allergen Reactions    Hydroxychloroquine Other (See Comments)     Other reaction(s): Anemia    Ketoprofen Unknown - Low Severity           Objective   "      Vital Signs:   Vitals:    04/30/25 1340   BP: 116/58   BP Location: Left arm   Patient Position: Sitting   Cuff Size: Adult   Pulse: 76   Temp: 97.3 °F (36.3 °C)   Weight: 76.2 kg (167 lb 14.4 oz)   Height: 160 cm (63\")   PainSc: 4      Body mass index is 29.74 kg/m².      Physical Exam:  Physical Exam   MUSCULOSKELETAL:   No peripheral synovitis pain and swelling left fifth PIP joint  Heberden and Manav's nodes present throughout the hands with angular deformities.    well healed scar from carpel tunnel release right wrist  Well-healed scar left knee from joint replacement.  Crepitus right knee.       Complete joint exam was performed including the MCPs, PIPs, DIPs of the hands, wrists, elbows, shoulders, hips, knees and ankles.  No soft tissue swelling or tenderness is present except as above.    General: The patient is well-developed and well nourished. Cooperative, alert and oriented. Affect is normal. Hydration appears normal.   HEENT: Normocephalic and atraumatic. Lids and conjunctiva are normal. Pupils are equal and sclera are clear. Oropharynx is clear   NECK neck is supple without adenopathy, masses or thyromegaly.   CARDIOVASCULAR: Regular rate and rhythm. No murmurs, rubs or gallops   LUNGS: Effort is normal. Lungs are clear bilateral   ABDOMEN: Not examined  EXTREMITIES: Peripheral pulses are intact. No clubbing.   SKIN: No rashes. No subcutaneous nodules. No digital ulcers. No sclerodactyly.   NEUROLOGIC: Gait is normal. Strength testing is normal.  No focal neurologic deficits    Results Review:   Labs:   Lab Results   Component Value Date    GLUCOSE 109 (H) 10/23/2024    BUN 19 10/23/2024    CREATININE 1.13 (H) 10/23/2024    EGFR 50.5 (L) 10/23/2024    BCR 16.8 10/23/2024    K 4.1 10/23/2024    CO2 22.7 10/23/2024    CALCIUM 8.6 10/23/2024    ALBUMIN 3.4 (L) 10/23/2024    BILITOT 0.3 10/23/2024    AST 32 10/23/2024    ALT 20 10/23/2024     Lab Results   Component Value Date    WBC 4.69 " "10/23/2024    HGB 11.2 (L) 10/23/2024    HCT 34.6 10/23/2024    MCV 93.5 10/23/2024     10/23/2024     Lab Results   Component Value Date    SEDRATE 19 10/23/2024     Lab Results   Component Value Date    CRP 0.11 09/17/2019     No results found for: \"QUANTIFERO\", \"QUANTITB1\", \"QUANTITB2\", \"QUANTIFERN\", \"QUANTIFERM\", \"QUANTITBGLDP\"  No results found for: \"RF\"  No results found for: \"HEPBSAG\", \"HEPAIGM\", \"HEPBIGMCORE\", \"HEPCVIRUSABY\"      Procedures    Assessment / Plan      Erosive osteoarthritis of both hands  Has [employer] season tickets since the 70s.   follows in clinic  sero neg; erosive OA dips;   no help mtx  Avoids NSAIDs with renal insufficiency  Prior Plaquenil 4199-5149, restart 12/15/23-3/24 (intolerant)  **Current: tramadol.     Erosive OA hands on x-ray 10/23/2024  Avoids NSAIDs with CKD     She stopped Plaquenil due to intolerance  Continue tramadol which she finds very helpful for her arthritis  She brings up the idea of trying extended release tramadol today  Recommend trial of tramadol external release 100 mg every morning with tramadol 50 mg 1 to 2 tablet twice daily as needed for breakthrough pain  Labs ordered today for monitoring as below.    Update hand x-rays  Medication refilled  Return to clinic 6 months    Generalized osteoarthrosis  Chronic back pain  chronic right shoulder pain with history of torn rotator cuff/OA; s/p injection 1.17; mri 2017  Current orthopedist for shoulder Dr. Huff  Previous Dr. Cisco Cristobal/ Dr stoner.  Dr. Peck ortho; left knee replacement September 2019     She has known right rotator cuff tear.  She has declined surgical options with her orthopedist as it does not bother her much  Avoid NSAIDs with CKD       High risk medication use  Encounter for chronic pain management  Opioid use  Tramadol     Urine drug screen intermittent for monitoring done 10/23/2024  Controlled medication agreement reviewed and signed 10/23/2024  Saman reviewed and " appropriate.  Well tolerated and effective for her chronic pain   Risks and benefits of opiate therapy discussed in detail with the patient.  These included but are not limited to potential problems with physical dependence/withdraw, addiction potential, tolerance, impaired decision making, balance and thinking problems that make it unsafe to drive a vehicle or operate dangerous machinery under the influence of opiates.  Patient has signed opiate contract detailing the accountability and legitimate medical use of controlled substances.  Recommend using the lowest effective dose only as needed to relieve pain.        Osteopenia  DEXA 3/23 [facility] shows osteopenia  Current:  Fosamax 12/15/23 start.     Recommend calcium vitamin-D and weight-bearing exercise  Recommend Fosamax once weekly   Risk benefits of bisphosphonates/prolia discussed in detail including but not limited to osteonecrosis jaw, atypical femoral fracture, bone death, kidney failure, hypocalcemia.  Patient has no reported jaw or tooth pain.  Update DEXA in 2025 [facility]    Hx of CABG  cabg in 2007; cardiology dr schwab.      1. Erosive osteoarthritis of both hands    2. Chronic back pain, unspecified back location, unspecified back pain laterality    3. Generalized osteoarthrosis, involving multiple sites    4. High risk medication use        Assessment & Plan        Orders Placed This Encounter   Procedures    XR Hand 2 View Bilateral    CBC Auto Differential    Comprehensive Metabolic Panel    C-reactive Protein    Sedimentation Rate     New Medications Ordered This Visit   Medications    traMADol (ULTRAM) 50 MG tablet     Sig: Take 1-2 tablets by mouth 2 (Two) Times a Day As Needed for Moderate Pain.     Dispense:  120 tablet     Refill:  2    traMADol HCl, ER Biphasic, 100 MG capsule sustained-release 24 hr     Sig: Take 1 capsule by mouth Every Morning. For pain     Dispense:  30 capsule     Refill:  2       Follow Up:    Return in about 6 months (around 10/30/2025).      Discussed plan of care in detail with the patient today.  Patient verbalized understanding and agrees.    I confirm accuracy of unchanged data/findings which have been carried forward from previous visit.  I have updated appropriately those that have changed.        Sudheer Gallo MD  [facility]

## 2025-05-05 ENCOUNTER — TELEPHONE (OUTPATIENT)
Age: 75
End: 2025-05-05
Payer: MEDICARE

## 2025-05-05 ENCOUNTER — RESULTS FOLLOW-UP (OUTPATIENT)
Age: 75
End: 2025-05-05
Payer: MEDICARE

## 2025-05-05 NOTE — TELEPHONE ENCOUNTER
"Hub staff attempted to follow warm transfer process and was unsuccessful     Caller: Chayito Lua    Relationship to patient: Self    Best call back number: [phone]    Patient is needing: PATIENT CALLED IN AND STATED THAT SHE WAS SUPPOSED TO ALSO RECEIVE AN \"EXTENDED RELEASE\" TRAMADOL MEDICATION WITH THE REGULAR TRAMADOL THAT SHE WAS ALREADY RECEIVING. PATIENT STATED THAT SHE HAS ONLY RECEIVED THE REGULAR ONE. PATIENT WOULD LIKE A CALL BACK TO BE ADVISED. OKAY TO CALL ANYTIME, OKAY TO LEAVE .        "

## 2025-05-05 NOTE — TELEPHONE ENCOUNTER
Provider: HALEY     Caller: Chayito Lua    Relationship to Patient: Self    Phone Number: [phone]    Reason for Call: PATIENT CALLED BACK TO GIVE AN UPDATE THAT [facility] SAID HER INSURANCE WOULD NOT COVER THE EXTENDED RELEASE TRAMADOL. SO SHE IS REQUESTING IT TO BE SENT IN TO [place] [id number] IN [place], SO SHE CAN TRY TO PICK IT UP. PLEASE CALL TO ADVISE

## 2025-05-05 NOTE — TELEPHONE ENCOUNTER
Submitted PA in [facility] for Tramadol ER, key [id number]. Approved 4/5/25-5/5/26. Called patient back and spoke with her ,  let him know that she should be able to fill rx through [facility] now. Advised them to call [facility] to let them know PA approved and to run through insurance again. He expressed understanding and will let us know if she has further issues filling rx.

## 2025-05-14 ENCOUNTER — TELEPHONE (OUTPATIENT)
Age: 75
End: 2025-05-14
Payer: MEDICARE

## 2025-05-14 DIAGNOSIS — M54.9 CHRONIC BACK PAIN, UNSPECIFIED BACK LOCATION, UNSPECIFIED BACK PAIN LATERALITY: ICD-10-CM

## 2025-05-14 DIAGNOSIS — G89.29 CHRONIC BACK PAIN, UNSPECIFIED BACK LOCATION, UNSPECIFIED BACK PAIN LATERALITY: ICD-10-CM

## 2025-05-14 RX ORDER — TRAMADOL HYDROCHLORIDE 100 MG/1
1 CAPSULE ORAL EVERY MORNING
Qty: 30 CAPSULE | Refills: 2 | Status: SHIPPED | OUTPATIENT
Start: 2025-05-14

## 2025-05-14 NOTE — TELEPHONE ENCOUNTER
Pt called and said that her insurance approved the Tramadol ER for 1 year, but her mail order pharmacy doesn't have it.  She would like for you to send it to her local pharmacy, [facility].  -[place]

## 2025-05-15 NOTE — TELEPHONE ENCOUNTER
Pt returned call. I let her know that the Tramadol ER had been sent to her local pharmacy per her request. -Jaci Regalado, RMA

## (undated) DEVICE — DRSNG PAD ABD 8X10IN STRL

## (undated) DEVICE — BNDG ELAS ELITE V/CLOSE 6IN 5YD LF STRL

## (undated) DEVICE — UNDERCAST PADDING: Brand: DEROYAL

## (undated) DEVICE — ANTIBACTERIAL UNDYED BRAIDED (POLYGLACTIN 910), SYNTHETIC ABSORBABLE SUTURE: Brand: COATED VICRYL

## (undated) DEVICE — STRYKER PERFORMANCE SERIES SAGITTAL BLADE: Brand: STRYKER PERFORMANCE SERIES

## (undated) DEVICE — GLV SURG SENSICARE MICRO PF LF 6 STRL

## (undated) DEVICE — SPNG GZ WOVN 4X4IN 12PLY 10/BX STRL

## (undated) DEVICE — T4 PULLOVER TOGA, X-LARGE

## (undated) DEVICE — PAD,ABDOMINAL,8"X10",ST,LF: Brand: MEDLINE

## (undated) DEVICE — GLV SURG SENSICARE MICRO PF LF 6.5 STRL

## (undated) DEVICE — GLV SURG PREMIERPRO MIC LTX PF SZ8.5 BRN

## (undated) DEVICE — SYS SKIN CLS DERMABOND PRINEO W/22CM MESH TP

## (undated) DEVICE — GLV SURG SIGNATURE TOUCH PF LTX 8 STRL BX/50

## (undated) DEVICE — PK KN TOTL 10

## (undated) DEVICE — PUMP PAIN AUTOFUSER AUTO SELCT NOBOLUS 1TO14ML/HR 550ML DISP

## (undated) DEVICE — SYR LUERLOK 50ML

## (undated) DEVICE — GLV SURG PREMIERPRO MIC LTX PF SZ7 BRN

## (undated) DEVICE — PREP SOL POVIDONE/IODINE BT 4OZ

## (undated) DEVICE — NDL SPINE 22G 31/2IN BLK

## (undated) DEVICE — BNDG ELAS W/CLIP 6IN 10YD LF STRL

## (undated) DEVICE — GLV SURG PREMIERPRO MIC LTX PF SZ6.5 BRN

## (undated) DEVICE — GLV SURG DERMASSURE GRN LF PF 8.0